# Patient Record
Sex: FEMALE
[De-identification: names, ages, dates, MRNs, and addresses within clinical notes are randomized per-mention and may not be internally consistent; named-entity substitution may affect disease eponyms.]

---

## 2017-07-29 ENCOUNTER — HEALTH MAINTENANCE LETTER (OUTPATIENT)
Age: 47
End: 2017-07-29

## 2017-10-03 ENCOUNTER — HOSPITAL ENCOUNTER (OUTPATIENT)
Dept: PREADMISSION TESTING | Age: 47
Discharge: HOME | End: 2017-10-03
Payer: COMMERCIAL

## 2017-10-03 ENCOUNTER — HOSPITAL ENCOUNTER (OUTPATIENT)
Dept: GENERAL RADIOLOGY | Age: 47
Discharge: HOME | End: 2017-10-03
Payer: COMMERCIAL

## 2017-10-03 VITALS
BODY MASS INDEX: 33.31 KG/M2 | HEIGHT: 62 IN | DIASTOLIC BLOOD PRESSURE: 84 MMHG | SYSTOLIC BLOOD PRESSURE: 137 MMHG | RESPIRATION RATE: 18 BRPM | WEIGHT: 181 LBS | TEMPERATURE: 98.9 F | HEART RATE: 88 BPM

## 2017-10-03 LAB
ABO + RH BLD: NORMAL
ALBUMIN SERPL-MCNC: 3.7 G/DL (ref 3.5–5)
ALBUMIN/GLOB SERPL: 0.9 {RATIO} (ref 1.1–2.2)
ALP SERPL-CCNC: 171 U/L (ref 45–117)
ALT SERPL-CCNC: 62 U/L (ref 12–78)
ANION GAP SERPL CALC-SCNC: 9 MMOL/L (ref 5–15)
AST SERPL-CCNC: 40 U/L (ref 15–37)
ATRIAL RATE: 85 BPM
BASOPHILS # BLD: 0 K/UL (ref 0–0.1)
BASOPHILS NFR BLD: 0 % (ref 0–1)
BILIRUB SERPL-MCNC: 0.4 MG/DL (ref 0.2–1)
BLOOD GROUP ANTIBODIES SERPL: NORMAL
BUN SERPL-MCNC: 13 MG/DL (ref 6–20)
BUN/CREAT SERPL: 17 (ref 12–20)
CALCIUM SERPL-MCNC: 9.5 MG/DL (ref 8.5–10.1)
CALCULATED P AXIS, ECG09: 52 DEGREES
CALCULATED R AXIS, ECG10: 35 DEGREES
CALCULATED T AXIS, ECG11: 37 DEGREES
CHLORIDE SERPL-SCNC: 97 MMOL/L (ref 97–108)
CO2 SERPL-SCNC: 32 MMOL/L (ref 21–32)
CREAT SERPL-MCNC: 0.78 MG/DL (ref 0.55–1.02)
DIAGNOSIS, 93000: NORMAL
EOSINOPHIL # BLD: 0.4 K/UL (ref 0–0.4)
EOSINOPHIL NFR BLD: 3 % (ref 0–7)
ERYTHROCYTE [DISTWIDTH] IN BLOOD BY AUTOMATED COUNT: 14.4 % (ref 11.5–14.5)
GLOBULIN SER CALC-MCNC: 3.9 G/DL (ref 2–4)
GLUCOSE SERPL-MCNC: 286 MG/DL (ref 65–100)
HCT VFR BLD AUTO: 35.3 % (ref 35–47)
HGB BLD-MCNC: 11.4 G/DL (ref 11.5–16)
LYMPHOCYTES # BLD: 2.6 K/UL (ref 0.8–3.5)
LYMPHOCYTES NFR BLD: 23 % (ref 12–49)
MCH RBC QN AUTO: 25.8 PG (ref 26–34)
MCHC RBC AUTO-ENTMCNC: 32.3 G/DL (ref 30–36.5)
MCV RBC AUTO: 79.9 FL (ref 80–99)
MONOCYTES # BLD: 0.8 K/UL (ref 0–1)
MONOCYTES NFR BLD: 7 % (ref 5–13)
NEUTS SEG # BLD: 7.4 K/UL (ref 1.8–8)
NEUTS SEG NFR BLD: 67 % (ref 32–75)
P-R INTERVAL, ECG05: 150 MS
PLATELET # BLD AUTO: 417 K/UL (ref 150–400)
POTASSIUM SERPL-SCNC: 3.6 MMOL/L (ref 3.5–5.1)
PROT SERPL-MCNC: 7.6 G/DL (ref 6.4–8.2)
Q-T INTERVAL, ECG07: 370 MS
QRS DURATION, ECG06: 90 MS
QTC CALCULATION (BEZET), ECG08: 440 MS
RBC # BLD AUTO: 4.42 M/UL (ref 3.8–5.2)
SODIUM SERPL-SCNC: 138 MMOL/L (ref 136–145)
SPECIMEN EXP DATE BLD: NORMAL
VENTRICULAR RATE, ECG03: 85 BPM
WBC # BLD AUTO: 11.1 K/UL (ref 3.6–11)

## 2017-10-03 PROCEDURE — 86900 BLOOD TYPING SEROLOGIC ABO: CPT | Performed by: SPECIALIST

## 2017-10-03 PROCEDURE — 36415 COLL VENOUS BLD VENIPUNCTURE: CPT | Performed by: SPECIALIST

## 2017-10-03 PROCEDURE — 85025 COMPLETE CBC W/AUTO DIFF WBC: CPT | Performed by: SPECIALIST

## 2017-10-03 PROCEDURE — 71020 XR CHEST PA LAT: CPT

## 2017-10-03 PROCEDURE — 93005 ELECTROCARDIOGRAM TRACING: CPT

## 2017-10-03 PROCEDURE — 80053 COMPREHEN METABOLIC PANEL: CPT | Performed by: SPECIALIST

## 2017-10-03 RX ORDER — METFORMIN HYDROCHLORIDE 1000 MG/1
1000 TABLET ORAL 2 TIMES DAILY WITH MEALS
COMMUNITY

## 2017-10-03 RX ORDER — SIMVASTATIN 20 MG/1
20 TABLET, FILM COATED ORAL
COMMUNITY

## 2017-10-03 RX ORDER — ENALAPRIL MALEATE 2.5 MG/1
2.5 TABLET ORAL DAILY
Status: ON HOLD | COMMUNITY
End: 2017-10-10

## 2017-10-03 RX ORDER — CHOLECALCIFEROL TAB 125 MCG (5000 UNIT) 125 MCG
5000 TAB ORAL DAILY
Status: ON HOLD | COMMUNITY
End: 2017-10-10 | Stop reason: SDUPTHER

## 2017-10-03 RX ORDER — GUAIFENESIN 100 MG/5ML
81 LIQUID (ML) ORAL
COMMUNITY

## 2017-10-03 RX ORDER — VALSARTAN AND HYDROCHLOROTHIAZIDE 160; 25 MG/1; MG/1
1 TABLET ORAL
Status: ON HOLD | COMMUNITY
End: 2017-10-10

## 2017-10-04 RX ORDER — INSULIN ASPART 100 [IU]/ML
40 INJECTION, SUSPENSION SUBCUTANEOUS 2 TIMES DAILY
Status: ON HOLD | COMMUNITY
End: 2017-10-10 | Stop reason: SDUPTHER

## 2017-10-09 ENCOUNTER — ANESTHESIA EVENT (OUTPATIENT)
Dept: SURGERY | Age: 47
End: 2017-10-09
Payer: COMMERCIAL

## 2017-10-10 ENCOUNTER — HOSPITAL ENCOUNTER (OUTPATIENT)
Age: 47
Setting detail: OBSERVATION
Discharge: HOME | End: 2017-10-11
Attending: SPECIALIST | Admitting: SPECIALIST
Payer: COMMERCIAL

## 2017-10-10 ENCOUNTER — ANESTHESIA (OUTPATIENT)
Dept: SURGERY | Age: 47
End: 2017-10-10
Payer: COMMERCIAL

## 2017-10-10 PROBLEM — D21.9 FIBROIDS: Status: ACTIVE | Noted: 2017-10-10

## 2017-10-10 LAB
GLUCOSE BLD STRIP.AUTO-MCNC: 193 MG/DL (ref 65–100)
GLUCOSE BLD STRIP.AUTO-MCNC: 327 MG/DL (ref 65–100)
GLUCOSE BLD STRIP.AUTO-MCNC: 88 MG/DL (ref 65–100)
HCG UR QL: NEGATIVE
SERVICE CMNT-IMP: ABNORMAL
SERVICE CMNT-IMP: ABNORMAL
SERVICE CMNT-IMP: NORMAL

## 2017-10-10 PROCEDURE — 76060000035 HC ANESTHESIA 2 TO 2.5 HR: Performed by: SPECIALIST

## 2017-10-10 PROCEDURE — 74011250636 HC RX REV CODE- 250/636: Performed by: ANESTHESIOLOGY

## 2017-10-10 PROCEDURE — 77030013079 HC BLNKT BAIR HGGR 3M -A: Performed by: ANESTHESIOLOGY

## 2017-10-10 PROCEDURE — 76210000017 HC OR PH I REC 1.5 TO 2 HR: Performed by: SPECIALIST

## 2017-10-10 PROCEDURE — 77030031139 HC SUT VCRL2 J&J -A: Performed by: SPECIALIST

## 2017-10-10 PROCEDURE — 74011636637 HC RX REV CODE- 636/637: Performed by: SPECIALIST

## 2017-10-10 PROCEDURE — 74011000250 HC RX REV CODE- 250: Performed by: SPECIALIST

## 2017-10-10 PROCEDURE — 77030008771 HC TU NG SALEM SUMP -A: Performed by: ANESTHESIOLOGY

## 2017-10-10 PROCEDURE — 82962 GLUCOSE BLOOD TEST: CPT

## 2017-10-10 PROCEDURE — 77030010100 HC SEAL ENDOSC AMR -B: Performed by: SPECIALIST

## 2017-10-10 PROCEDURE — C1782 MORCELLATOR: HCPCS | Performed by: SPECIALIST

## 2017-10-10 PROCEDURE — 77030026438 HC STYL ET INTUB CARD -A: Performed by: ANESTHESIOLOGY

## 2017-10-10 PROCEDURE — 77030020263 HC SOL INJ SOD CL0.9% LFCR 1000ML: Performed by: SPECIALIST

## 2017-10-10 PROCEDURE — 65210000002 HC RM PRIVATE GYN

## 2017-10-10 PROCEDURE — 77030031492 HC PRT ACC BLNT AIRSEAL CNMD -B: Performed by: SPECIALIST

## 2017-10-10 PROCEDURE — 77030020268 HC MISC GENERAL SUPPLY: Performed by: SPECIALIST

## 2017-10-10 PROCEDURE — 74011250636 HC RX REV CODE- 250/636

## 2017-10-10 PROCEDURE — 76010000876 HC OR TIME 2 TO 2.5HR INTENSV - TIER 2: Performed by: SPECIALIST

## 2017-10-10 PROCEDURE — 74011250636 HC RX REV CODE- 250/636: Performed by: SPECIALIST

## 2017-10-10 PROCEDURE — 99218 HC RM OBSERVATION: CPT

## 2017-10-10 PROCEDURE — 77030016151 HC PROTCTR LNS DFOG COVD -B: Performed by: SPECIALIST

## 2017-10-10 PROCEDURE — 77030008684 HC TU ET CUF COVD -B: Performed by: ANESTHESIOLOGY

## 2017-10-10 PROCEDURE — 77030035277 HC OBTRTR BLDELSS DISP INTU -B: Performed by: SPECIALIST

## 2017-10-10 PROCEDURE — 77030033067 HC SUT PDO STRATFX SPIR J&J -B: Performed by: SPECIALIST

## 2017-10-10 PROCEDURE — 77030003580 HC NDL INSUF VERES J&J -B: Performed by: SPECIALIST

## 2017-10-10 PROCEDURE — 77030032060 HC PWDR HEMSTAT ARISTA ASRB 3GM BARD -C: Performed by: SPECIALIST

## 2017-10-10 PROCEDURE — 74011000250 HC RX REV CODE- 250

## 2017-10-10 PROCEDURE — 77030010507 HC ADH SKN DERMBND J&J -B: Performed by: SPECIALIST

## 2017-10-10 PROCEDURE — 77030008756 HC TU IRR SUC STRY -B: Performed by: SPECIALIST

## 2017-10-10 PROCEDURE — 81025 URINE PREGNANCY TEST: CPT

## 2017-10-10 PROCEDURE — 77030002933 HC SUT MCRYL J&J -A: Performed by: SPECIALIST

## 2017-10-10 PROCEDURE — 77030011640 HC PAD GRND REM COVD -A: Performed by: SPECIALIST

## 2017-10-10 PROCEDURE — 77030002895 HC DEV VASC CLOSR COVD -B: Performed by: SPECIALIST

## 2017-10-10 PROCEDURE — 77030018836 HC SOL IRR NACL ICUM -A: Performed by: SPECIALIST

## 2017-10-10 PROCEDURE — 77030027743 HC APPL F/HEMSTAT BARD -B: Performed by: SPECIALIST

## 2017-10-10 PROCEDURE — 88307 TISSUE EXAM BY PATHOLOGIST: CPT | Performed by: SPECIALIST

## 2017-10-10 PROCEDURE — 74011250637 HC RX REV CODE- 250/637: Performed by: ANESTHESIOLOGY

## 2017-10-10 PROCEDURE — 77030005518 HC CATH URETH FOL 2W BARD -B: Performed by: SPECIALIST

## 2017-10-10 PROCEDURE — 77030020782 HC GWN BAIR PAWS FLX 3M -B

## 2017-10-10 RX ORDER — MIDAZOLAM HYDROCHLORIDE 1 MG/ML
1 INJECTION, SOLUTION INTRAMUSCULAR; INTRAVENOUS AS NEEDED
Status: DISCONTINUED | OUTPATIENT
Start: 2017-10-10 | End: 2017-10-10 | Stop reason: HOSPADM

## 2017-10-10 RX ORDER — SODIUM CHLORIDE 0.9 % (FLUSH) 0.9 %
5-10 SYRINGE (ML) INJECTION AS NEEDED
Status: DISCONTINUED | OUTPATIENT
Start: 2017-10-10 | End: 2017-10-10 | Stop reason: HOSPADM

## 2017-10-10 RX ORDER — LIDOCAINE HYDROCHLORIDE 20 MG/ML
INJECTION, SOLUTION EPIDURAL; INFILTRATION; INTRACAUDAL; PERINEURAL AS NEEDED
Status: DISCONTINUED | OUTPATIENT
Start: 2017-10-10 | End: 2017-10-10 | Stop reason: HOSPADM

## 2017-10-10 RX ORDER — PROPOFOL 10 MG/ML
INJECTION, EMULSION INTRAVENOUS AS NEEDED
Status: DISCONTINUED | OUTPATIENT
Start: 2017-10-10 | End: 2017-10-10 | Stop reason: HOSPADM

## 2017-10-10 RX ORDER — ONDANSETRON 2 MG/ML
4 INJECTION INTRAMUSCULAR; INTRAVENOUS AS NEEDED
Status: DISCONTINUED | OUTPATIENT
Start: 2017-10-10 | End: 2017-10-10 | Stop reason: HOSPADM

## 2017-10-10 RX ORDER — CHOLECALCIFEROL (VITAMIN D3) 125 MCG
CAPSULE ORAL
Refills: 3 | COMMUNITY
Start: 2017-08-16

## 2017-10-10 RX ORDER — HYDROMORPHONE HYDROCHLORIDE 1 MG/ML
1 INJECTION, SOLUTION INTRAMUSCULAR; INTRAVENOUS; SUBCUTANEOUS
Status: COMPLETED | OUTPATIENT
Start: 2017-10-10 | End: 2017-10-10

## 2017-10-10 RX ORDER — CEFAZOLIN SODIUM IN 0.9 % NACL 2 G/50 ML
2 INTRAVENOUS SOLUTION, PIGGYBACK (ML) INTRAVENOUS
Status: COMPLETED | OUTPATIENT
Start: 2017-10-10 | End: 2017-10-10

## 2017-10-10 RX ORDER — ROCURONIUM BROMIDE 10 MG/ML
INJECTION, SOLUTION INTRAVENOUS AS NEEDED
Status: DISCONTINUED | OUTPATIENT
Start: 2017-10-10 | End: 2017-10-10 | Stop reason: HOSPADM

## 2017-10-10 RX ORDER — ENOXAPARIN SODIUM 100 MG/ML
40 INJECTION SUBCUTANEOUS
Status: COMPLETED | OUTPATIENT
Start: 2017-10-10 | End: 2017-10-10

## 2017-10-10 RX ORDER — GLYCOPYRROLATE 0.2 MG/ML
INJECTION INTRAMUSCULAR; INTRAVENOUS AS NEEDED
Status: DISCONTINUED | OUTPATIENT
Start: 2017-10-10 | End: 2017-10-10 | Stop reason: HOSPADM

## 2017-10-10 RX ORDER — MIDAZOLAM HYDROCHLORIDE 1 MG/ML
INJECTION, SOLUTION INTRAMUSCULAR; INTRAVENOUS AS NEEDED
Status: DISCONTINUED | OUTPATIENT
Start: 2017-10-10 | End: 2017-10-10 | Stop reason: HOSPADM

## 2017-10-10 RX ORDER — DIPHENHYDRAMINE HYDROCHLORIDE 50 MG/ML
12.5 INJECTION, SOLUTION INTRAMUSCULAR; INTRAVENOUS AS NEEDED
Status: DISCONTINUED | OUTPATIENT
Start: 2017-10-10 | End: 2017-10-10 | Stop reason: HOSPADM

## 2017-10-10 RX ORDER — CEFAZOLIN SODIUM IN 0.9 % NACL 2 G/50 ML
2 INTRAVENOUS SOLUTION, PIGGYBACK (ML) INTRAVENOUS EVERY 8 HOURS
Status: COMPLETED | OUTPATIENT
Start: 2017-10-10 | End: 2017-10-11

## 2017-10-10 RX ORDER — FENTANYL CITRATE 50 UG/ML
50 INJECTION, SOLUTION INTRAMUSCULAR; INTRAVENOUS AS NEEDED
Status: DISCONTINUED | OUTPATIENT
Start: 2017-10-10 | End: 2017-10-10 | Stop reason: HOSPADM

## 2017-10-10 RX ORDER — FENTANYL CITRATE 50 UG/ML
25 INJECTION, SOLUTION INTRAMUSCULAR; INTRAVENOUS
Status: COMPLETED | OUTPATIENT
Start: 2017-10-10 | End: 2017-10-10

## 2017-10-10 RX ORDER — FERROUS SULFATE 324(65)MG
TABLET, DELAYED RELEASE (ENTERIC COATED) ORAL
Refills: 3 | COMMUNITY
Start: 2017-07-22

## 2017-10-10 RX ORDER — HYDROMORPHONE HYDROCHLORIDE 1 MG/ML
1 INJECTION, SOLUTION INTRAMUSCULAR; INTRAVENOUS; SUBCUTANEOUS
Status: DISCONTINUED | OUTPATIENT
Start: 2017-10-10 | End: 2017-10-11 | Stop reason: HOSPADM

## 2017-10-10 RX ORDER — SODIUM CHLORIDE, SODIUM LACTATE, POTASSIUM CHLORIDE, CALCIUM CHLORIDE 600; 310; 30; 20 MG/100ML; MG/100ML; MG/100ML; MG/100ML
75 INJECTION, SOLUTION INTRAVENOUS CONTINUOUS
Status: DISCONTINUED | OUTPATIENT
Start: 2017-10-10 | End: 2017-10-10 | Stop reason: HOSPADM

## 2017-10-10 RX ORDER — NEOSTIGMINE METHYLSULFATE 1 MG/ML
INJECTION INTRAVENOUS AS NEEDED
Status: DISCONTINUED | OUTPATIENT
Start: 2017-10-10 | End: 2017-10-10 | Stop reason: HOSPADM

## 2017-10-10 RX ORDER — METRONIDAZOLE 500 MG/100ML
500 INJECTION, SOLUTION INTRAVENOUS
Status: COMPLETED | OUTPATIENT
Start: 2017-10-10 | End: 2017-10-10

## 2017-10-10 RX ORDER — INSULIN ASPART 100 [IU]/ML
INJECTION, SUSPENSION SUBCUTANEOUS
Refills: 1 | COMMUNITY
Start: 2017-08-04

## 2017-10-10 RX ORDER — SODIUM CHLORIDE, SODIUM LACTATE, POTASSIUM CHLORIDE, CALCIUM CHLORIDE 600; 310; 30; 20 MG/100ML; MG/100ML; MG/100ML; MG/100ML
INJECTION, SOLUTION INTRAVENOUS
Status: DISCONTINUED | OUTPATIENT
Start: 2017-10-10 | End: 2017-10-10 | Stop reason: HOSPADM

## 2017-10-10 RX ORDER — MAGNESIUM SULFATE 100 %
4 CRYSTALS MISCELLANEOUS AS NEEDED
Status: DISCONTINUED | OUTPATIENT
Start: 2017-10-10 | End: 2017-10-11 | Stop reason: HOSPADM

## 2017-10-10 RX ORDER — ASPIRIN 81 MG/1
TABLET ORAL
Refills: 2 | Status: ON HOLD | COMMUNITY
Start: 2017-08-09 | End: 2017-10-10 | Stop reason: SDUPTHER

## 2017-10-10 RX ORDER — SODIUM CHLORIDE 9 MG/ML
50 INJECTION, SOLUTION INTRAVENOUS CONTINUOUS
Status: DISCONTINUED | OUTPATIENT
Start: 2017-10-10 | End: 2017-10-10 | Stop reason: HOSPADM

## 2017-10-10 RX ORDER — OXYCODONE HYDROCHLORIDE 5 MG/1
5 TABLET ORAL
Status: DISCONTINUED | OUTPATIENT
Start: 2017-10-10 | End: 2017-10-11 | Stop reason: HOSPADM

## 2017-10-10 RX ORDER — HYDROMORPHONE HYDROCHLORIDE 1 MG/ML
INJECTION, SOLUTION INTRAMUSCULAR; INTRAVENOUS; SUBCUTANEOUS AS NEEDED
Status: DISCONTINUED | OUTPATIENT
Start: 2017-10-10 | End: 2017-10-10 | Stop reason: HOSPADM

## 2017-10-10 RX ORDER — FENTANYL CITRATE 50 UG/ML
INJECTION, SOLUTION INTRAMUSCULAR; INTRAVENOUS AS NEEDED
Status: DISCONTINUED | OUTPATIENT
Start: 2017-10-10 | End: 2017-10-10 | Stop reason: HOSPADM

## 2017-10-10 RX ORDER — ENALAPRIL MALEATE 10 MG/1
TABLET ORAL
Refills: 2 | COMMUNITY
Start: 2017-08-10

## 2017-10-10 RX ORDER — METFORMIN HYDROCHLORIDE 500 MG/1
1000 TABLET ORAL 2 TIMES DAILY WITH MEALS
Status: DISCONTINUED | OUTPATIENT
Start: 2017-10-10 | End: 2017-10-11 | Stop reason: HOSPADM

## 2017-10-10 RX ORDER — ZOLPIDEM TARTRATE 5 MG/1
5 TABLET ORAL
Status: DISCONTINUED | OUTPATIENT
Start: 2017-10-10 | End: 2017-10-11 | Stop reason: HOSPADM

## 2017-10-10 RX ORDER — INSULIN GLARGINE 100 [IU]/ML
61 INJECTION, SOLUTION SUBCUTANEOUS
Status: DISCONTINUED | OUTPATIENT
Start: 2017-10-10 | End: 2017-10-11 | Stop reason: HOSPADM

## 2017-10-10 RX ORDER — SODIUM CHLORIDE 0.9 % (FLUSH) 0.9 %
5-10 SYRINGE (ML) INJECTION AS NEEDED
Status: DISCONTINUED | OUTPATIENT
Start: 2017-10-10 | End: 2017-10-11 | Stop reason: HOSPADM

## 2017-10-10 RX ORDER — KETOROLAC TROMETHAMINE 30 MG/ML
INJECTION, SOLUTION INTRAMUSCULAR; INTRAVENOUS AS NEEDED
Status: DISCONTINUED | OUTPATIENT
Start: 2017-10-10 | End: 2017-10-10 | Stop reason: HOSPADM

## 2017-10-10 RX ORDER — LIDOCAINE HYDROCHLORIDE 10 MG/ML
0.1 INJECTION, SOLUTION EPIDURAL; INFILTRATION; INTRACAUDAL; PERINEURAL AS NEEDED
Status: DISCONTINUED | OUTPATIENT
Start: 2017-10-10 | End: 2017-10-10 | Stop reason: HOSPADM

## 2017-10-10 RX ORDER — INSULIN LISPRO 100 [IU]/ML
INJECTION, SOLUTION INTRAVENOUS; SUBCUTANEOUS EVERY 6 HOURS
Status: DISCONTINUED | OUTPATIENT
Start: 2017-10-10 | End: 2017-10-11 | Stop reason: HOSPADM

## 2017-10-10 RX ORDER — BUPIVACAINE HYDROCHLORIDE AND EPINEPHRINE 2.5; 5 MG/ML; UG/ML
30 INJECTION, SOLUTION EPIDURAL; INFILTRATION; INTRACAUDAL; PERINEURAL ONCE
Status: COMPLETED | OUTPATIENT
Start: 2017-10-10 | End: 2017-10-10

## 2017-10-10 RX ORDER — DIPHENHYDRAMINE HCL 25 MG
25 CAPSULE ORAL
Status: DISCONTINUED | OUTPATIENT
Start: 2017-10-10 | End: 2017-10-11 | Stop reason: HOSPADM

## 2017-10-10 RX ORDER — DEXTROSE 50 % IN WATER (D50W) INTRAVENOUS SYRINGE
12.5-25 AS NEEDED
Status: DISCONTINUED | OUTPATIENT
Start: 2017-10-10 | End: 2017-10-11 | Stop reason: HOSPADM

## 2017-10-10 RX ORDER — ENALAPRIL MALEATE 5 MG/1
10 TABLET ORAL DAILY
Status: DISCONTINUED | OUTPATIENT
Start: 2017-10-11 | End: 2017-10-11 | Stop reason: HOSPADM

## 2017-10-10 RX ORDER — NALOXONE HYDROCHLORIDE 0.4 MG/ML
0.2 INJECTION, SOLUTION INTRAMUSCULAR; INTRAVENOUS; SUBCUTANEOUS AS NEEDED
Status: DISCONTINUED | OUTPATIENT
Start: 2017-10-10 | End: 2017-10-11 | Stop reason: HOSPADM

## 2017-10-10 RX ORDER — SCOLOPAMINE TRANSDERMAL SYSTEM 1 MG/1
1.5 PATCH, EXTENDED RELEASE TRANSDERMAL
Status: COMPLETED | OUTPATIENT
Start: 2017-10-10 | End: 2017-10-10

## 2017-10-10 RX ORDER — MORPHINE SULFATE 10 MG/ML
2 INJECTION, SOLUTION INTRAMUSCULAR; INTRAVENOUS
Status: DISCONTINUED | OUTPATIENT
Start: 2017-10-10 | End: 2017-10-10 | Stop reason: HOSPADM

## 2017-10-10 RX ORDER — ONDANSETRON 2 MG/ML
INJECTION INTRAMUSCULAR; INTRAVENOUS AS NEEDED
Status: DISCONTINUED | OUTPATIENT
Start: 2017-10-10 | End: 2017-10-10 | Stop reason: HOSPADM

## 2017-10-10 RX ORDER — SUCCINYLCHOLINE CHLORIDE 20 MG/ML
INJECTION INTRAMUSCULAR; INTRAVENOUS AS NEEDED
Status: DISCONTINUED | OUTPATIENT
Start: 2017-10-10 | End: 2017-10-10 | Stop reason: HOSPADM

## 2017-10-10 RX ORDER — MIDAZOLAM HYDROCHLORIDE 1 MG/ML
0.5 INJECTION, SOLUTION INTRAMUSCULAR; INTRAVENOUS
Status: DISCONTINUED | OUTPATIENT
Start: 2017-10-10 | End: 2017-10-10 | Stop reason: HOSPADM

## 2017-10-10 RX ORDER — SODIUM CHLORIDE, SODIUM LACTATE, POTASSIUM CHLORIDE, CALCIUM CHLORIDE 600; 310; 30; 20 MG/100ML; MG/100ML; MG/100ML; MG/100ML
150 INJECTION, SOLUTION INTRAVENOUS CONTINUOUS
Status: DISCONTINUED | OUTPATIENT
Start: 2017-10-10 | End: 2017-10-11 | Stop reason: HOSPADM

## 2017-10-10 RX ORDER — SODIUM CHLORIDE 0.9 % (FLUSH) 0.9 %
5-10 SYRINGE (ML) INJECTION EVERY 8 HOURS
Status: DISCONTINUED | OUTPATIENT
Start: 2017-10-10 | End: 2017-10-11 | Stop reason: HOSPADM

## 2017-10-10 RX ORDER — IBUPROFEN 200 MG
600 TABLET ORAL
Status: DISCONTINUED | OUTPATIENT
Start: 2017-10-10 | End: 2017-10-11 | Stop reason: HOSPADM

## 2017-10-10 RX ORDER — HYDROMORPHONE HYDROCHLORIDE 1 MG/ML
0.2 INJECTION, SOLUTION INTRAMUSCULAR; INTRAVENOUS; SUBCUTANEOUS
Status: DISCONTINUED | OUTPATIENT
Start: 2017-10-10 | End: 2017-10-10 | Stop reason: HOSPADM

## 2017-10-10 RX ORDER — DEXAMETHASONE SODIUM PHOSPHATE 4 MG/ML
INJECTION, SOLUTION INTRA-ARTICULAR; INTRALESIONAL; INTRAMUSCULAR; INTRAVENOUS; SOFT TISSUE AS NEEDED
Status: DISCONTINUED | OUTPATIENT
Start: 2017-10-10 | End: 2017-10-10 | Stop reason: HOSPADM

## 2017-10-10 RX ORDER — SODIUM CHLORIDE 0.9 % (FLUSH) 0.9 %
5-10 SYRINGE (ML) INJECTION EVERY 8 HOURS
Status: DISCONTINUED | OUTPATIENT
Start: 2017-10-10 | End: 2017-10-10 | Stop reason: HOSPADM

## 2017-10-10 RX ADMIN — FENTANYL CITRATE 50 MCG: 50 INJECTION, SOLUTION INTRAMUSCULAR; INTRAVENOUS at 10:30

## 2017-10-10 RX ADMIN — ENOXAPARIN SODIUM 40 MG: 40 INJECTION SUBCUTANEOUS at 09:19

## 2017-10-10 RX ADMIN — LIDOCAINE HYDROCHLORIDE 80 MG: 20 INJECTION, SOLUTION EPIDURAL; INFILTRATION; INTRACAUDAL; PERINEURAL at 10:30

## 2017-10-10 RX ADMIN — HYDROMORPHONE HYDROCHLORIDE 1 MG: 1 INJECTION, SOLUTION INTRAMUSCULAR; INTRAVENOUS; SUBCUTANEOUS at 20:48

## 2017-10-10 RX ADMIN — FENTANYL CITRATE 25 MCG: 50 INJECTION, SOLUTION INTRAMUSCULAR; INTRAVENOUS at 14:00

## 2017-10-10 RX ADMIN — HYDROMORPHONE HYDROCHLORIDE 0.2 MG: 1 INJECTION, SOLUTION INTRAMUSCULAR; INTRAVENOUS; SUBCUTANEOUS at 14:41

## 2017-10-10 RX ADMIN — GLYCOPYRROLATE 0.5 MG: 0.2 INJECTION INTRAMUSCULAR; INTRAVENOUS at 12:30

## 2017-10-10 RX ADMIN — SUCCINYLCHOLINE CHLORIDE 160 MG: 20 INJECTION INTRAMUSCULAR; INTRAVENOUS at 10:31

## 2017-10-10 RX ADMIN — HYDROMORPHONE HYDROCHLORIDE 0.2 MG: 1 INJECTION, SOLUTION INTRAMUSCULAR; INTRAVENOUS; SUBCUTANEOUS at 14:10

## 2017-10-10 RX ADMIN — FENTANYL CITRATE 75 MCG: 50 INJECTION, SOLUTION INTRAMUSCULAR; INTRAVENOUS at 11:09

## 2017-10-10 RX ADMIN — NEOSTIGMINE METHYLSULFATE 3.5 MG: 1 INJECTION INTRAVENOUS at 12:30

## 2017-10-10 RX ADMIN — ROCURONIUM BROMIDE 5 MG: 10 INJECTION, SOLUTION INTRAVENOUS at 10:30

## 2017-10-10 RX ADMIN — MIDAZOLAM HYDROCHLORIDE 2 MG: 1 INJECTION, SOLUTION INTRAMUSCULAR; INTRAVENOUS at 10:22

## 2017-10-10 RX ADMIN — ROCURONIUM BROMIDE 25 MG: 10 INJECTION, SOLUTION INTRAVENOUS at 10:40

## 2017-10-10 RX ADMIN — ONDANSETRON 4 MG: 2 INJECTION INTRAMUSCULAR; INTRAVENOUS at 10:39

## 2017-10-10 RX ADMIN — HYDROMORPHONE HYDROCHLORIDE 0.5 MG: 1 INJECTION, SOLUTION INTRAMUSCULAR; INTRAVENOUS; SUBCUTANEOUS at 12:26

## 2017-10-10 RX ADMIN — INSULIN GLARGINE 61 UNITS: 100 INJECTION, SOLUTION SUBCUTANEOUS at 22:51

## 2017-10-10 RX ADMIN — CEFAZOLIN 2 G: 1 INJECTION, POWDER, FOR SOLUTION INTRAMUSCULAR; INTRAVENOUS; PARENTERAL at 10:37

## 2017-10-10 RX ADMIN — HYDROMORPHONE HYDROCHLORIDE 1 MG: 1 INJECTION, SOLUTION INTRAMUSCULAR; INTRAVENOUS; SUBCUTANEOUS at 16:18

## 2017-10-10 RX ADMIN — DEXAMETHASONE SODIUM PHOSPHATE 6 MG: 4 INJECTION, SOLUTION INTRA-ARTICULAR; INTRALESIONAL; INTRAMUSCULAR; INTRAVENOUS; SOFT TISSUE at 10:39

## 2017-10-10 RX ADMIN — SODIUM CHLORIDE, SODIUM LACTATE, POTASSIUM CHLORIDE, AND CALCIUM CHLORIDE 150 ML/HR: 600; 310; 30; 20 INJECTION, SOLUTION INTRAVENOUS at 22:50

## 2017-10-10 RX ADMIN — CEFAZOLIN 2 G: 1 INJECTION, POWDER, FOR SOLUTION INTRAMUSCULAR; INTRAVENOUS; PARENTERAL at 20:53

## 2017-10-10 RX ADMIN — FENTANYL CITRATE 25 MCG: 50 INJECTION, SOLUTION INTRAMUSCULAR; INTRAVENOUS at 13:42

## 2017-10-10 RX ADMIN — SODIUM CHLORIDE, SODIUM LACTATE, POTASSIUM CHLORIDE, AND CALCIUM CHLORIDE 150 ML/HR: 600; 310; 30; 20 INJECTION, SOLUTION INTRAVENOUS at 15:20

## 2017-10-10 RX ADMIN — FENTANYL CITRATE 50 MCG: 50 INJECTION, SOLUTION INTRAMUSCULAR; INTRAVENOUS at 12:31

## 2017-10-10 RX ADMIN — SODIUM CHLORIDE, SODIUM LACTATE, POTASSIUM CHLORIDE, CALCIUM CHLORIDE: 600; 310; 30; 20 INJECTION, SOLUTION INTRAVENOUS at 10:20

## 2017-10-10 RX ADMIN — HYDROMORPHONE HYDROCHLORIDE 0.25 MG: 1 INJECTION, SOLUTION INTRAMUSCULAR; INTRAVENOUS; SUBCUTANEOUS at 12:32

## 2017-10-10 RX ADMIN — INSULIN LISPRO 2 UNITS: 100 INJECTION, SOLUTION INTRAVENOUS; SUBCUTANEOUS at 14:31

## 2017-10-10 RX ADMIN — ROCURONIUM BROMIDE 5 MG: 10 INJECTION, SOLUTION INTRAVENOUS at 11:23

## 2017-10-10 RX ADMIN — METRONIDAZOLE 500 MG: 500 INJECTION, SOLUTION INTRAVENOUS at 10:57

## 2017-10-10 RX ADMIN — FENTANYL CITRATE 25 MCG: 50 INJECTION, SOLUTION INTRAMUSCULAR; INTRAVENOUS at 13:12

## 2017-10-10 RX ADMIN — FENTANYL CITRATE 75 MCG: 50 INJECTION, SOLUTION INTRAMUSCULAR; INTRAVENOUS at 10:43

## 2017-10-10 RX ADMIN — HYDROMORPHONE HYDROCHLORIDE 0.2 MG: 1 INJECTION, SOLUTION INTRAMUSCULAR; INTRAVENOUS; SUBCUTANEOUS at 14:26

## 2017-10-10 RX ADMIN — Medication 10 ML: at 16:20

## 2017-10-10 RX ADMIN — KETOROLAC TROMETHAMINE 30 MG: 30 INJECTION, SOLUTION INTRAMUSCULAR; INTRAVENOUS at 12:32

## 2017-10-10 RX ADMIN — ROCURONIUM BROMIDE 10 MG: 10 INJECTION, SOLUTION INTRAVENOUS at 11:31

## 2017-10-10 RX ADMIN — PROPOFOL 200 MG: 10 INJECTION, EMULSION INTRAVENOUS at 10:30

## 2017-10-10 RX ADMIN — SODIUM CHLORIDE, SODIUM LACTATE, POTASSIUM CHLORIDE, CALCIUM CHLORIDE: 600; 310; 30; 20 INJECTION, SOLUTION INTRAVENOUS at 11:37

## 2017-10-10 RX ADMIN — FENTANYL CITRATE 25 MCG: 50 INJECTION, SOLUTION INTRAMUSCULAR; INTRAVENOUS at 13:50

## 2017-10-10 RX ADMIN — SCOPALAMINE 1.5 MG: 1 PATCH, EXTENDED RELEASE TRANSDERMAL at 10:22

## 2017-10-10 RX ADMIN — PROMETHAZINE HYDROCHLORIDE 12.5 MG: 25 INJECTION INTRAMUSCULAR; INTRAVENOUS at 16:23

## 2017-10-10 RX ADMIN — INSULIN LISPRO 7 UNITS: 100 INJECTION, SOLUTION INTRAVENOUS; SUBCUTANEOUS at 21:17

## 2017-10-10 RX ADMIN — HYDROMORPHONE HYDROCHLORIDE 0.25 MG: 1 INJECTION, SOLUTION INTRAMUSCULAR; INTRAVENOUS; SUBCUTANEOUS at 11:23

## 2017-10-10 NOTE — IP AVS SNAPSHOT
[address] 
[phone] Patient: Tonia Mcmahon MRN: [id number] ESS:1/20/9043 Current Discharge Medication List  
  
START taking these medications Dose & Instructions Dispensing Information Comments Morning Noon Evening Bedtime  
 ibuprofen 600 mg tablet Commonly known as:  MOTRIN Your last dose was: Your next dose is:    
   
   
 Dose:  600 mg Take 1 Tab by mouth every six (6) hours as needed. Indications: Pain Quantity:  30 Tab Refills:  0  
     
   
   
   
  
 oxyCODONE IR 5 mg immediate release tablet Commonly known as:  Kendal Mike Your last dose was: Your next dose is:    
   
   
 Dose:  5 mg Take 1 Tab by mouth every six (6) hours as needed. Max Daily Amount: 20 mg.  
 Quantity:  30 Tab Refills:  0 ASK your doctor about these medications Dose & Instructions Dispensing Information Comments Morning Noon Evening Bedtime  
 aspirin 81 mg chewable tablet What changed:  Another medication with the same name was removed. Continue taking this medication, and follow the directions you see here. Your last dose was: Your next dose is:    
   
   
 Dose:  81 mg Take 81 mg by mouth every morning. Refills:  0  
     
   
   
   
  
 cholecalciferol 5,000 unit capsule Commonly known as:  VITAMIN D3 What changed:  Another medication with the same name was removed. Continue taking this medication, and follow the directions you see here. Your last dose was: Your next dose is: TAKE 1 CAPSULE BY MOUTH ONCE DAILY Refills:  3  
     
   
   
   
  
 enalapril 10 mg tablet Commonly known as:  Javan Nissen Your last dose was: Your next dose is: TAKE 1 TABLET BY MOUTH EVERY DAY ONCE A DAY ORALLY 90 DAYS Refills:  2 ferrous sulfate 324 mg (65 mg iron) tablet Your last dose was: Your next dose is: TAKE 1 TABLET ONCE A DAY ORALLY 30 DAY(S) Refills:  3  
     
   
   
   
  
 metFORMIN 1,000 mg tablet Commonly known as:  GLUCOPHAGE Your last dose was: Your next dose is:    
   
   
 Dose:  1000 mg Take 1,000 mg by mouth two (2) times daily (with meals). Refills:  0 NovoLOG Mix 70-30 FlexPen 100 unit/mL (70-30) Inpn Generic drug:  insulin aspart protamine/insulin aspart What changed:  Another medication with the same name was removed. Continue taking this medication, and follow the directions you see here. Your last dose was: Your next dose is:    
   
   
 INYECTA 35 UNIDADES CON DESAYUNO,40 UNIDADES CON ALMUERZO,Y 35 UNIDADES CON LA TANI 3 VECES CADA GABRIELA Refills:  1  
     
   
   
   
  
 simvastatin 20 mg tablet Commonly known as:  ZOCOR Your last dose was: Your next dose is:    
   
   
 Dose:  20 mg Take 20 mg by mouth nightly. Refills:  0  
     
   
   
   
  
 TOUJEO SOLOSTAR 300 unit/mL (1.5 mL) Inpn Generic drug:  insulin glargine Your last dose was: Your next dose is:    
   
   
 Dose:  72 Units 72 Units by SubCUTAneous route nightly. Refills:  0 Where to Get Your Medications Information on where to get these meds will be given to you by the nurse or doctor. ! Ask your nurse or doctor about these medications  
  ibuprofen 600 mg tablet  
 oxyCODONE IR 5 mg immediate release tablet

## 2017-10-10 NOTE — ROUTINE PROCESS
Patient: Ruben Cheung MRN: [id number]  SSN: [id number]   YOB: 1970  Age: 56 y.o. Sex: female     Patient is status post Procedure(s):  DAVINCI SUPRACERVICAL LAPAROSCOPIC HYSTERECTOMY, BILATERAL SALPINGECTOMY; RIGHT OOPHERECTOMY. Surgeon(s) and Role:     * Josiane Rosario MD - Primary     * Viola Woo MD - Co-Surgeon     * Sadi Granda MD - Assisting    Local/Dose/Irrigation:  Marcaine 0.25% with epi 30 ml                  Peripheral IV 10/10/17 Left Hand (Active)   Site Assessment Clean, dry, & intact 10/10/2017  9:21 AM   Phlebitis Assessment 0 10/10/2017  9:21 AM   Infiltration Assessment 0 10/10/2017  9:21 AM   Dressing Status Clean, dry, & intact; New 10/10/2017  9:21 AM   Dressing Type Tape;Transparent 10/10/2017  9:21 AM   Hub Color/Line Status Green; Infusing 10/10/2017  9:21 AM   Action Taken Open ports on tubing capped 10/10/2017  9:21 AM   Alcohol Cap Used Yes 10/10/2017  9:21 AM            Airway - Endotracheal Tube 10/10/17 Oral (Active)                   Dressing/Packing:  Wound Abdomen Anterior-DRESSING TYPE: Topical skin adhesive/glue; Adhesive wound dressing (Band-Aid) (10/10/17 1232)  Splint/Cast:  ]    Other:  Ramsay; scds;peripad          Patient: Ruben Cheung MRN: [id number]  SSN: [id number]   YOB: 1970  Age: 56 y.o. Sex: female     Patient is status post Procedure(s):  DAVINCI SUPRACERVICAL LAPAROSCOPIC HYSTERECTOMY, BILATERAL SALPINGECTOMY; RIGHT OOPHERECTOMY. Surgeon(s) and Role:     * Josiane Rosario MD - Primary     * Viola Woo MD - Co-Surgeon     * Sadi Granda MD - Assisting    Local/Dose/Irrigation:                    Peripheral IV 10/10/17 Left Hand (Active)   Site Assessment Clean, dry, & intact 10/10/2017  9:21 AM   Phlebitis Assessment 0 10/10/2017  9:21 AM   Infiltration Assessment 0 10/10/2017  9:21 AM   Dressing Status Clean, dry, & intact; New 10/10/2017  9:21 AM   Dressing Type Tape;Transparent 10/10/2017  9:21 AM Hub Color/Line Status Green; Infusing 10/10/2017  9:21 AM   Action Taken Open ports on tubing capped 10/10/2017  9:21 AM   Alcohol Cap Used Yes 10/10/2017  9:21 AM            Airway - Endotracheal Tube 10/10/17 Oral (Active)                   Dressing/Packing:  Wound Abdomen Anterior-DRESSING TYPE: Topical skin adhesive/glue; Adhesive wound dressing (Band-Aid) (10/10/17 1232)  Splint/Cast:  ]    Other:

## 2017-10-10 NOTE — PROGRESS NOTES
Problem: Abdominal Hysterectomy: Day of Surgery  Goal: *Optimal pain control at patients stated goal  Outcome: Progressing Towards Goal  Using IV pain medication

## 2017-10-10 NOTE — ANESTHESIA PREPROCEDURE EVALUATION
Anesthetic History     PONV          Review of Systems / Medical History  Patient summary reviewed, nursing notes reviewed and pertinent labs reviewed    Pulmonary  Within defined limits                 Neuro/Psych   Within defined limits           Cardiovascular    Hypertension              Exercise tolerance: >4 METS     GI/Hepatic/Renal  Within defined limits              Endo/Other    Diabetes    Obesity     Other Findings              Physical Exam    Airway  Mallampati: III  TM Distance: 4 - 6 cm  Neck ROM: normal range of motion   Mouth opening: Normal     Cardiovascular  Regular rate and rhythm,  S1 and S2 normal,  no murmur, click, rub, or gallop  Rhythm: regular  Rate: normal         Dental  No notable dental hx       Pulmonary  Breath sounds clear to auscultation               Abdominal  GI exam deferred       Other Findings            Anesthetic Plan    ASA: 3  Anesthesia type: general          Induction: Intravenous  Anesthetic plan and risks discussed with: Patient

## 2017-10-10 NOTE — PERIOP NOTES
TRANSFER - OUT REPORT:    Verbal report given to Joselin Cisneros on Osiel Owusu  being transferred to room 360 for routine post - op       Report consisted of patients Situation, Background, Assessment and   Recommendations(SBAR). Time Pre op antibiotic given:2 gram ancef/ flagyl  Anesthesia Stop time: 6216  Ramsay Present on Transfer to floor:yes  Order for Ramsay on Chart:yes    Information from the following report(s) SBAR, OR Summary, Intake/Output and MAR was reviewed with the receiving nurse. Opportunity for questions and clarification was provided. Is the patient on 02? YES       L/Min 2       Other     Is the patient on a monitor? NO    Is the nurse transporting with the patient? NO    Surgical Waiting Area notified of patient's transfer from PACU? YES      The following personal items collected during your admission accompanied patient upon transfer:   Dental Appliance:    Vision:    Hearing Aid:    Jewelry: Jewelry: None  Clothing: Clothing:  (clothing to [employer])  Other Valuables:  Other Valuables: None  Valuables sent to safe:

## 2017-10-10 NOTE — PROGRESS NOTES
TRANSFER - IN REPORT:    Verbal report received from [address] (name) on Darrell Blackburn  being received from PACU (unit) for routine post - op      Report consisted of patients Situation, Background, Assessment and   Recommendations(SBAR). Information from the following report(s) SBAR, Kardex, OR Summary, Procedure Summary, Intake/Output, MAR, Accordion, Recent Results and Cardiac Rhythm Sinus Rhythm was reviewed with the receiving nurse. Opportunity for questions and clarification was provided. Assessment completed upon patients arrival to unit and care assumed.

## 2017-10-10 NOTE — IP AVS SNAPSHOT
[address] 
[phone] Patient: Burt Swartz MRN: [id number] NXJ:8/30/[birth year] You are allergic to the following Allergen Reactions Tylenol (Acetaminophen) Itching Recent Documentation Height Weight BMI OB Status Smoking Status 1.575 m 82.1 kg 33.11 kg/m2 Having regular periods Never Smoker Emergency Contacts Name Discharge Info Relation Home Work Mobile Rudy Love DISCHARGE CAREGIVER [3] Spouse [3] [phone] About your hospitalization You were admitted on:  October 10, 2017 You last received care in the:  [address] You were discharged on:  October 11, 2017 Unit phone number:  [phone] Why you were hospitalized Your primary diagnosis was:  Not on File Your diagnoses also included:  Fibroids Providers Seen During Your Hospitalizations Provider Role Specialty Primary office phone Elodia Torres MD Attending Provider Obstetrics & Gynecology [phone] Your Primary Care Physician (PCP) Primary Care Physician Office Phone Office Fax Sherrine Snellen [phone] Follow-up Information Follow up With Details Comments Contact Info Liberty Morales MD   [address] [place] [address] 
[phone] Current Discharge Medication List  
  
START taking these medications Dose & Instructions Dispensing Information Comments Morning Noon Evening Bedtime  
 ibuprofen 600 mg tablet Commonly known as:  MOTRIN Your last dose was: Your next dose is:    
   
   
 Dose:  600 mg Take 1 Tab by mouth every six (6) hours as needed. Indications: Pain Quantity:  30 Tab Refills:  0  
     
   
   
   
  
 oxyCODONE IR 5 mg immediate release tablet Commonly known as:  Kash Murphy  
   
 Your last dose was: Your next dose is:    
   
   
 Dose:  5 mg Take 1 Tab by mouth every six (6) hours as needed. Max Daily Amount: 20 mg.  
 Quantity:  30 Tab Refills:  0 ASK your doctor about these medications Dose & Instructions Dispensing Information Comments Morning Noon Evening Bedtime  
 aspirin 81 mg chewable tablet What changed:  Another medication with the same name was removed. Continue taking this medication, and follow the directions you see here. Your last dose was: Your next dose is:    
   
   
 Dose:  81 mg Take 81 mg by mouth every morning. Refills:  0  
     
   
   
   
  
 cholecalciferol 5,000 unit capsule Commonly known as:  VITAMIN D3 What changed:  Another medication with the same name was removed. Continue taking this medication, and follow the directions you see here. Your last dose was: Your next dose is: TAKE 1 CAPSULE BY MOUTH ONCE DAILY Refills:  3  
     
   
   
   
  
 enalapril 10 mg tablet Commonly known as:  Harrold Harps Your last dose was: Your next dose is: TAKE 1 TABLET BY MOUTH EVERY DAY ONCE A DAY ORALLY 90 DAYS Refills:  2  
     
   
   
   
  
 ferrous sulfate 324 mg (65 mg iron) tablet Your last dose was: Your next dose is: TAKE 1 TABLET ONCE A DAY ORALLY 30 DAY(S) Refills:  3  
     
   
   
   
  
 metFORMIN 1,000 mg tablet Commonly known as:  GLUCOPHAGE Your last dose was: Your next dose is:    
   
   
 Dose:  1000 mg Take 1,000 mg by mouth two (2) times daily (with meals). Refills:  0 NovoLOG Mix 70-30 FlexPen 100 unit/mL (70-30) Inpn Generic drug:  insulin aspart protamine/insulin aspart What changed:  Another medication with the same name was removed. Continue taking this medication, and follow the directions you see here. Your last dose was: Your next dose is:    
   
   
 INYECTA 35 UNIDADES CON DESAYUNO,40 UNIDADES CON ALMUERZO,Y 35 UNIDADES CON LA TANI 3 VECES CADA GABRIELA Refills:  1  
     
   
   
   
  
 simvastatin 20 mg tablet Commonly known as:  ZOCOR Your last dose was: Your next dose is:    
   
   
 Dose:  20 mg Take 20 mg by mouth nightly. Refills:  0  
     
   
   
   
  
 TOUJEO SOLOSTAR 300 unit/mL (1.5 mL) Inpn Generic drug:  insulin glargine Your last dose was: Your next dose is:    
   
   
 Dose:  72 Units 72 Units by SubCUTAneous route nightly. Refills:  0 Where to Get Your Medications Information on where to get these meds will be given to you by the nurse or doctor. ! Ask your nurse or doctor about these medications  
  ibuprofen 600 mg tablet  
 oxyCODONE IR 5 mg immediate release tablet Discharge Instructions Histerectomía laparoscópica: Jenness Ormond en el hogar - [ Laparoscopic Hysterectomy: What to Expect at [facility] ] Jimenez recuperación Nitzacash Villa es kailey cirugía para extraer el Destiny Marcelo. En algunos casos, también se extraen los ovarios y las trompas de [place] al [employer]. Usted puede esperar sentirse mejor y más saad cada día, aunque es posible que necesite analgésicos (medicamentos para el dolor) por kailey o [place]. Es posible que se canse con facilidad o que tenga menos energía de lo habitual. El cansancio podría durar varias semanas después de la cirugía. Probablemente notará que jimenez abdomen está hinchado. [place] es común. La hinchazón tardará varias semanas en desaparecer. Nicola vez necesite entre 4 y 6 semanas hasta que se recupere por completo. Es importante que evite levantar objetos jono la recuperación para que pueda sanar. Esta hoja de [employer] idea general de cuánto tiempo tardará en recuperarse. Trista cada persona se recupera a un ritmo diferente.  Siga los pasos a continuación para mejorar tan rápido muriel sea posible. Cómo puede cuidarse en el hogar? Actividad · Descanse cuando se sienta cansada. · Lerry Fruit. Caminar es kailey buena opción. · Deje que sane la arlen. No se mueva con rapidez ni levante nada pesado hasta que se encuentre mejor. · Puede ducharse entre 24 y 50 horas después de la [facility], si edwards médico lo [place]. Seque la incisión con toques suaves de toalla. No tome un baño de inmersión jono las primeras 2 semanas, o hasta que edwards médico lo apruebe. · Pregúntele a edwards médico cuándo puede tener [employer]. Alimentación · Puede seguir edwards dieta normal. Si tiene [employer], pruebe alimentos suaves y bajos en grasa, muriel arroz sin condimentar, khris asado, pan maura y yogur. · Si no evacua los intestinos con regularidad magi después de la cirugía, trate de evitar el estreñimiento y hacer esfuerzos. Jeannette abundante agua. Edwards médico podría sugerirle que tome fibra, un ablandador de heces o un laxante [place]. Medicamentos · Edwards médico le dirá si puede volver a ashley juju medicamentos y cuándo puede volver a hacerlo. También le dará indicaciones sobre cualquier medicamento nuevo que deba ashley usted. · Si niels medicamentos que previenen la formación de coágulos de Hemal, muriel warfarina (Coumadin), clopidogrel (Plavix) o aspirina, asegúrese de hablar con edwards médico. Él o luba le dirá si debe volver a ashley estos medicamentos y en qué momento. Asegúrese de que entiende exactamente lo que el médico quiere que mary. · Sea alexandra con los medicamentos. Indira y siga todas las instrucciones de la [place]. ¨ Si el médico le recetó analgésicos (medicamentos para el dolor), tómelos según las indicaciones. ¨ Si no está tomando un analgésico recetado, pregúntele a edwards médico si puede ashley kalen de [employer]. · Si edwards médico le recetó antibióticos, tómelos según las indicaciones.  No deje de tomarlos solo porque se sienta mejor. Debe ashley todos los antibióticos hasta terminarlos. Cuidado de la incisión · Es posible que tenga puntos de sutura sobre los mario (incisiones) que el médico le hizo en el abdomen. · Si tiene tiras de cinta adhesiva sobre el yasmin (incisión) que hizo el médico, déjeselas puestas kailey semana o hasta que se caigan por sí solas. · Lave la arlen a diario con agua tibia micronesia y séquela con toques suaves de toalla. No use agua oxigenada ni alcohol. Pueden retrasar la sanación. · Puede cubrir la arlen con kailey venda de gasa si le sale líquido o esta roza contra la ropa. · Cambie la venda todos los días. Otras instrucciones · Podría tener un leve sangrado vaginal. Use toallas sanitarias si es necesario. No se mary lavados vaginales ni utilice tampones. · No tenga relaciones sexuales hasta que edwards médico lo apruebe. La atención de seguimiento es kailey parte clave de edwards tratamiento y seguridad. Asegúrese de hacer y acudir a todas las citas, y llame a edwards médico si está teniendo problemas. También es kailey buena idea saber los resultados de juju exámenes y mantener kailey lista de los medicamentos que niels. Cuándo debe pedir ayuda? Llame al 911 en cualquier momento que considere que puede necesitar atención de [place]. Por ejemplo, llame si: 
· Se desmayó (perdió el conocimiento). · Tiene dolor repentino en el pecho y falta de [place], o tose ankit. Llame a edwards médico ahora mismo o busque atención médica inmediata si: · Tiene sangrado vaginal intenso. Emerson significa que usted empapa juju toallas sanitarias habituales cada hora jono 2 horas o más. · Tiene dolor repentino e intenso en el abdomen o en la pelvis. · Tiene puntos de sutura flojos o se abre la incisión. · [id number], tales muriel: ¨ Aumento del dolor, la hinchazón, la temperatura o el enrojecimiento. ¨ Vetas kerr que salen de la incisión. ¨ Pus que sale de la incisión. ¨ Ganglios linfáticos inflamados en el isael, las axilas o la jose. Sergio Pee. Preste especial atención a los cambios en edwards david y asegúrese de comunicarse con edwards médico si: 
· No mejora muriel se esperaba. Dónde puede encontrar más información en inglés? Jac Ramos a [email or web address]-araceli.[email or web address] Raymundobony Kohler Q131 en la búsqueda para aprender más acerca de \"Histerectomía laparoscópica: Aimee Garcia en el hogar - [ Laparoscopic Hysterectomy: What to Expect at [facility] ]. \" 
Revisado: 23 noviembre, 2016 Versión del contenido: 11.3 © 0103-8014 Healthwise, Incorporated. Las instrucciones de cuidado fueron adaptadas bajo licencia por [employer] (which disclaims liability or warranty for this information). Si usted tiene [place] afección médica o sobre estas instrucciones, siempre pregunte a edwards profesional de david. Healthwise, Incorporated niega toda garantía o responsabilidad por edwards uso de esta información. Discharge Orders None Introducing [facility]! Nan Mai introduces cocone patient portal. Now you can access parts of your medical record, email your doctor's office, and request medication refills online. 1. In your internet browser, go to [email or web address] 2. Click on the First Time User? Click Here link in the Sign In box. You will see the New Member Sign Up page. 3. Enter your cocone Access Code exactly as it appears below. You will not need to use this code after youve completed the sign-up process. If you do not sign up before the expiration date, you must request a new code. · cocone Access Code: [email or web address] Expires: 1/1/2018  1:31 PM 
 
4. Enter the last four digits of your Social Security Number (xxxx) and Date of Birth (mm/dd/yyyy) as indicated and click Submit. You will be taken to the next sign-up page. 5. Create a MyChart ID.  This will be your cocone login ID and cannot be changed, so think of one that is secure and easy to remember. 6. Create a Zeuss password. You can change your password at any time. 7. Enter your Password Reset Question and Answer. This can be used at a later time if you forget your password. 8. Enter your e-mail address. You will receive e-mail notification when new information is available in [address] 9. Click Sign Up. You can now view and download portions of your medical record. 10. Click the Download Summary menu link to download a portable copy of your medical information. If you have questions, please visit the Frequently Asked Questions section of the Zeuss website. Remember, Zeuss is NOT to be used for urgent needs. For medical emergencies, dial 911. Now available from your iPhone and Android! General Information Please provide this summary of care documentation to your next provider. Patient Signature:  ____________________________________________________________ Date:  ____________________________________________________________  
  
[facility] Provider Signature:  ____________________________________________________________ Date:  ____________________________________________________________

## 2017-10-10 NOTE — OP NOTES
[address] [facility] [address], [address]   OP NOTE       Name:  Soledad Favre   MR#:  [id number]   :  1970   Account #:  [de-identified]    Surgery Date:  10/10/2017   Date of Adm:  10/10/2017       SURGEON: Dr. Inocencia Hill     ASSISTANT: Zofia Salas MD and Kami Morgan MD    PROCEDURES PERFORMED: Jose Martin Ready laparoscopic supracervical   hysterectomy with right salpingo-oophorectomy and left salpingectomy. PREOPERATIVE DIAGNOSIS: Symptomatic fibroid uterus. POSTOPERATIVE DIAGNOSIS: Symptomatic fibroid uterus. ANESTHESIA: General endotracheal.    FINDINGS: With laparoscopy, there was an enlarged fibroid uterus. There were multiple adhesions around the uterus, especially to the   anterior abdominal wall and posteriorly to the bowel. The posterior cul-  de-sac was obliterated with adhesions. The right tube and ovary were   strongly adherent to the uterine surface. The left tube and ovary   appeared normal.    ESTIMATED BLOOD LOSS: 100 mL. DESCRIPTION OF PROCEDURE: The patient was taken to the   operating room, placed on the operating room table. After adequate   anesthesia was obtained, she was placed in the dorsal lithotomy   position. The abdomen, perineum and vagina were prepped and the   patient was draped in the usual sterile fashion. A sterile speculum was   placed. A single-tooth tenaculum was placed on the anterior lip of the   cervix. A uterine manipulator was placed and all other instruments   were removed. The umbilical skin and bilateral port sites were injected with Marcaine. The umbilical skin was grasped with 2 towel clips. A Veress needle   was inserted. The abdomen was insufflated to approximately 2 liters of   CO2 gas. The Veress needle was removed and a small incision was   made. A 12 mm bladeless trocar was inserted. Laparoscopy verified   successful entry.  Eight mm da Armin ports were placed bilaterally, as   well as an 8 mm port in the right lower quadrant. These were all done   under direct visualization. The patient was placed in Trendelenburg   position. The Amara7 robot was docked and instruments were placed. Attention was turned to the adhesion between the uterus, bladder and   the anterior abdominal wall. These were taken down using sharp and   blunt dissection. Attention was turned to the adhesions in the posterior   cul-de-sac to the rectosigmoid. These were carefully taken down using   sharp and blunt dissection and hydrodissection. Attention was then turned to the right infundibulopelvic ligament which   was cauterized with bipolar cautery and cut using the hot dani. The   right side of the bladder flap was created. Attention was turned to the left utero-ovarian ligament, round ligament,   and fallopian tube, which were cauterized using bipolar cautery and cut   using hot dani and the left side of the bladder flap was created. There was more dissection to free up the uterus, both anteriorly and   posteriorly. There were severe adhesions from the uterus to the right   pelvic sidewall as well. These were carefully taken down using sharp   and blunt dissection. The uterine vessels were then bilaterally   cauterized and cut. The uterus was amputated from the cervix using   the hot dani and bipolar cautery. The endocervical canal was   cauterized. The pelvis was irrigated and suctioned. The right lower   quadrant port was replaced with the morcellator. The uterus was   carefully dissected off the bowel to completely free it up. The uterus   was removed using the morcellator. All pieces were removed. The right   tube and ovary were removed with the uterus. When this was complete, attention was turned to the left mesosalpinx,   which was cauterized and cut using the hot dani and the left tube   was removed. The pelvis was irrigated and suctioned. Small bleeders   were controlled using bipolar cautery.  Hemostasis was assured. Tisseel and Erick were placed over all operative areas and   hemostasis was once again assured. The robot was removed from the   patient. The right lower quadrant port was closed at the fascia using an   EndoClose with 0 Vicryl. The abdomen was desufflated and all trocars   were removed. Skin incisions were closed using interrupted sutures of   4-0 Monocryl and Dermabond. There was clear urine draining from the   Ramsay at the end of the procedure. All sponge, needle and instrument   counts were correct x2 at the end of the procedure. The patient   tolerated the procedure well and was taken to the post-anesthesia care   unit in satisfactory condition. SPECIMENS REMOVED: Morcellated uterus, right tube and ovary, left   fallopian tube.         Shazia Cage MD      [facility] Trisha Fowler   D:  10/10/2017   12:37   T:  10/10/2017   13:35   Job #:  [id number]

## 2017-10-10 NOTE — H&P
Gynecology History and Physical    Name: Vicki Paez MRN: [id number] SSN: [id number]    YOB: 1970  Age: 56 y.o. Sex: female       Subjective:      Chief complaint:  Shari Carranza I is a 56 y.o.  female with a history of fibroids. Previous workup included Ultrasound which revealed fibroid(s). Previous treatment measures included nonsteroidal anti-inflammatory drugs (NSAIDs). She is admitted for Procedure(s) (LRB):  DAVINCI TOTAL LAPAROSCOPIC HYSTERECTOMY, BILATERAL SALPINGECTOMY (N/A). The current method of family planning is none. OB History     No data available        Past Medical History:   Diagnosis Date    Diabetes ([address]     Hypertension     Nausea & vomiting      Past Surgical History:   Procedure Laterality Date    HX GYN  2011    REMOVAL OF FIBROIDS    HX ORTHOPAEDIC      CARPEL TUNNEL R HAND    HX ORTHOPAEDIC      L BEHIND THE  KNEE SURGERY      Social History     Occupational History    Not on file. Social History Main Topics    Smoking status: Never Smoker    Smokeless tobacco: Never Used    Alcohol use No    Drug use: No    Sexual activity: Not on file     Family History   Problem Relation Age of Onset    Diabetes Mother     No Known Problems Sister     No Known Problems Brother     No Known Problems Sister     No Known Problems Sister     No Known Problems Sister     No Known Problems Brother     No Known Problems Brother     No Known Problems Brother     No Known Problems Brother     Diabetes Brother         Allergies   Allergen Reactions    Tylenol [Acetaminophen] Itching     Prior to Admission medications    Medication Sig Start Date End Date Taking?  Authorizing Provider   cholecalciferol (VITAMIN D3) 5,000 unit capsule TAKE 1 CAPSULE BY MOUTH ONCE DAILY 8/16/17  Yes Historical Provider   ferrous sulfate 324 mg (65 mg iron) tablet TAKE 1 TABLET ONCE A DAY ORALLY 30 DAY(S) 7/22/17  Yes Historical Provider   enalapril (VASOTEC) 10 mg tablet TAKE 1 TABLET BY MOUTH EVERY DAY ONCE A DAY ORALLY 90 DAYS 8/10/17  Yes Historical Provider   NOVOLOG MIX 70-30 FLEXPEN 100 unit/mL (70-30) inpn INYECTA 35 UNIDADES CON DESAYUNO,40 UNIDADES CON ALMUERZO,Y 35 UNIDADES CON LA TANI 3 VECES CADA GABRIELA 8/4/17  Yes Historical Provider   insulin glargine (TOUJEO SOLOSTAR) 300 unit/mL (1.5 mL) inpn 72 Units by SubCUTAneous route nightly. Yes Historical Provider   simvastatin (ZOCOR) 20 mg tablet Take 20 mg by mouth nightly. Yes Historical Provider   metFORMIN (GLUCOPHAGE) 1,000 mg tablet Take 1,000 mg by mouth two (2) times daily (with meals). Yes Historical Provider   aspirin 81 mg chewable tablet Take 81 mg by mouth every morning. Historical Provider        Review of Systems:  A comprehensive review of systems was negative except for that written in the History of Present Illness. Objective:     Vitals:    10/10/17 0912   BP: 132/58   Pulse: 75   Resp: 16   Temp: 97.9 °F (36.6 °C)   SpO2: 99%   Weight: 82.1 kg (181 lb)   Height: 5' 2\" (1.575 m)       Physical Exam:  Deferred    Assessment:     Active Problems:    * No active hospital problems. *     Menorrhagia with fibroids    Plan:     Procedure(s) (LRB):  DAVINCI TOTAL LAPAROSCOPIC HYSTERECTOMY, BILATERAL SALPINGECTOMY (N/A)  Discussed the risks of surgery including the risks of bleeding, infection, deep vein thrombosis, and surgical injuries to internal organs including but not limited to the bowels, bladder, rectum, and female reproductive organs. The patient understands the risks; any and all questions were answered to the patient's satisfaction.     Signed By:  Narinder Wynn MD     October 10, 2017

## 2017-10-11 VITALS
OXYGEN SATURATION: 99 % | HEIGHT: 62 IN | BODY MASS INDEX: 33.31 KG/M2 | SYSTOLIC BLOOD PRESSURE: 153 MMHG | TEMPERATURE: 98.2 F | DIASTOLIC BLOOD PRESSURE: 70 MMHG | WEIGHT: 181 LBS | RESPIRATION RATE: 20 BRPM | HEART RATE: 85 BPM

## 2017-10-11 LAB
BASOPHILS # BLD: 0 K/UL (ref 0–0.1)
BASOPHILS NFR BLD: 0 % (ref 0–1)
EOSINOPHIL # BLD: 0 K/UL (ref 0–0.4)
EOSINOPHIL NFR BLD: 0 % (ref 0–7)
ERYTHROCYTE [DISTWIDTH] IN BLOOD BY AUTOMATED COUNT: 14.8 % (ref 11.5–14.5)
GLUCOSE BLD STRIP.AUTO-MCNC: 209 MG/DL (ref 65–100)
GLUCOSE BLD STRIP.AUTO-MCNC: 254 MG/DL (ref 65–100)
HCT VFR BLD AUTO: 24.3 % (ref 35–47)
HGB BLD-MCNC: 7.8 G/DL (ref 11.5–16)
LYMPHOCYTES # BLD: 1.6 K/UL (ref 0.8–3.5)
LYMPHOCYTES NFR BLD: 19 % (ref 12–49)
MCH RBC QN AUTO: 25.7 PG (ref 26–34)
MCHC RBC AUTO-ENTMCNC: 32.1 G/DL (ref 30–36.5)
MCV RBC AUTO: 80.2 FL (ref 80–99)
MONOCYTES # BLD: 0.9 K/UL (ref 0–1)
MONOCYTES NFR BLD: 10 % (ref 5–13)
NEUTS SEG # BLD: 6.1 K/UL (ref 1.8–8)
NEUTS SEG NFR BLD: 71 % (ref 32–75)
PLATELET # BLD AUTO: 367 K/UL (ref 150–400)
RBC # BLD AUTO: 3.03 M/UL (ref 3.8–5.2)
SERVICE CMNT-IMP: ABNORMAL
SERVICE CMNT-IMP: ABNORMAL
WBC # BLD AUTO: 8.6 K/UL (ref 3.6–11)

## 2017-10-11 PROCEDURE — 74011250637 HC RX REV CODE- 250/637: Performed by: SPECIALIST

## 2017-10-11 PROCEDURE — 74011250636 HC RX REV CODE- 250/636: Performed by: SPECIALIST

## 2017-10-11 PROCEDURE — 99218 HC RM OBSERVATION: CPT

## 2017-10-11 PROCEDURE — 36415 COLL VENOUS BLD VENIPUNCTURE: CPT | Performed by: SPECIALIST

## 2017-10-11 PROCEDURE — 85025 COMPLETE CBC W/AUTO DIFF WBC: CPT | Performed by: SPECIALIST

## 2017-10-11 PROCEDURE — 74011636637 HC RX REV CODE- 636/637: Performed by: SPECIALIST

## 2017-10-11 PROCEDURE — 82962 GLUCOSE BLOOD TEST: CPT

## 2017-10-11 RX ORDER — OXYCODONE HYDROCHLORIDE 5 MG/1
5 TABLET ORAL
Qty: 30 TAB | Refills: 0 | Status: SHIPPED | OUTPATIENT
Start: 2017-10-11

## 2017-10-11 RX ORDER — IBUPROFEN 600 MG/1
600 TABLET ORAL
Qty: 30 TAB | Refills: 0 | Status: SHIPPED | OUTPATIENT
Start: 2017-10-11

## 2017-10-11 RX ADMIN — INSULIN LISPRO 5 UNITS: 100 INJECTION, SOLUTION INTRAVENOUS; SUBCUTANEOUS at 03:19

## 2017-10-11 RX ADMIN — INSULIN LISPRO 3 UNITS: 100 INJECTION, SOLUTION INTRAVENOUS; SUBCUTANEOUS at 12:10

## 2017-10-11 RX ADMIN — CEFAZOLIN 2 G: 1 INJECTION, POWDER, FOR SOLUTION INTRAMUSCULAR; INTRAVENOUS; PARENTERAL at 12:39

## 2017-10-11 RX ADMIN — OXYCODONE HYDROCHLORIDE 5 MG: 5 TABLET ORAL at 12:43

## 2017-10-11 RX ADMIN — METFORMIN HYDROCHLORIDE 1000 MG: 500 TABLET, FILM COATED ORAL at 07:13

## 2017-10-11 RX ADMIN — OXYCODONE HYDROCHLORIDE 5 MG: 5 TABLET ORAL at 05:54

## 2017-10-11 RX ADMIN — SODIUM CHLORIDE, SODIUM LACTATE, POTASSIUM CHLORIDE, AND CALCIUM CHLORIDE 150 ML/HR: 600; 310; 30; 20 INJECTION, SOLUTION INTRAVENOUS at 05:53

## 2017-10-11 RX ADMIN — IBUPROFEN 600 MG: 200 TABLET, FILM COATED ORAL at 09:45

## 2017-10-11 RX ADMIN — CEFAZOLIN 2 G: 1 INJECTION, POWDER, FOR SOLUTION INTRAMUSCULAR; INTRAVENOUS; PARENTERAL at 04:43

## 2017-10-11 NOTE — PROGRESS NOTES
Bedside and Verbal shift change report given to Homero Nunez RN (oncoming nurse) by Zina Mack RN (offgoing nurse). Report included the following information SBAR, Kardex, OR Summary, Procedure Summary, Intake/Output, MAR, Accordion and Recent Results.

## 2017-10-11 NOTE — DISCHARGE INSTRUCTIONS
Histerectomía laparoscópica: Major Lo en el hogar - [ Laparoscopic Hysterectomy: What to Expect at [facility] ]  Edwards recuperación    Nat Uatsdin es kailey cirugía para extraer el Adell Snellen. En algunos casos, también se extraen los ovarios y las trompas de [place]op[place] al [employer]. Usted puede esperar sentirse mejor y más saad cada día, aunque es posible que necesite analgésicos (medicamentos para el dolor) por kailey o Pierz. Es posible que se canse con facilidad o que tenga menos energía de lo habitual. El cansancio podría durar varias semanas después de la cirugía. Probablemente notará que edwards abdomen está hinchado. Lazy Y U es común. La hinchazón tardará varias semanas en desaparecer. Nicola vez necesite entre 4 y 6 semanas hasta que se recupere por completo. Es importante que evite levantar objetos jono la recuperación para que pueda sanar. Esta hoja de [employer] idea general de cuánto tiempo tardará en recuperarse. Trista cada persona se recupera a un ritmo diferente. Siga los pasos a continuación para mejorar tan rápido muriel sea posible. ¿Cómo puede cuidarse en el [facility]? Actividad  · Descanse cuando se sienta cansada. · Alver Galas. Caminar es kailey buena opción. · Deje que sane la arlen. No se mueva con rapidez ni levante nada pesado hasta que se encuentre mejor. · Puede ducharse entre 24 y 50 horas después de la [place], si edwards médico lo [place]. Seque la incisión con toques suaves de toalla. No tome un baño de inmersión jono las primeras 2 semanas, o hasta que edwards médico lo apruebe. · Pregúntele a edwards médico cuándo puede tener [employer]. Alimentación  · Puede seguir edwards dieta normal. Si tiene [employer], pruebe alimentos suaves y bajos en grasa, muriel arroz sin condimentar, khris asado, pan maura y yogur. · Si no evacua los intestinos con regularidad magi después de la cirugía, trate de evitar el estreñimiento y hacer esfuerzos. Jeannette abundante agua.  Edwards médico podría sugerirle que tome fibra, un ablandador de heces o un laxante [place]. Medicamentos  · Edwards médico le dirá si puede volver a ashley juju medicamentos y cuándo puede volver a hacerlo. También le dará indicaciones sobre cualquier medicamento nuevo que deba ashley usted. · Si niels medicamentos que previenen la formación de coágulos de Hemal, muriel warfarina (Coumadin), clopidogrel (Plavix) o aspirina, asegúrese de hablar con edwards médico. Él o luba le dirá si debe volver a ashley estos medicamentos y en qué momento. Asegúrese de que entiende exactamente lo que el médico quiere que mary. · Sea alexandra con los medicamentos. Indira y siga todas las instrucciones de la [place]. ¨ Si el médico le recetó analgésicos (medicamentos para el dolor), tómelos según las indicaciones. ¨ Si no está tomando un analgésico recetado, pregúntele a edwards médico si puede ashley kalen de [employer]. · Si edwards médico le recetó antibióticos, tómelos según las indicaciones. No deje de tomarlos solo porque se sienta mejor. Debe ashley todos los antibióticos hasta terminarlos. Cuidado de la incisión  · Es posible que tenga puntos de sutura sobre los mario (incisiones) que el médico le hizo en el abdomen. · Si tiene tiras de cinta adhesiva sobre el yasmin (incisión) que hizo el médico, déjeselas puestas kailey semana o hasta que se caigan por sí solas. · Lave la arlen a diario con agua tibia Ryan y séquela con toques suaves de toalla. No use agua oxigenada ni alcohol. Pueden retrasar la sanación. · Puede cubrir la arlen con kailey venda de gasa si le sale líquido o esta roza contra la ropa. · Cambie la venda todos los días. Otras instrucciones  · Podría tener un leve sangrado vaginal. Use toallas sanitarias si es necesario. No se mary lavados vaginales ni utilice tampones. · No tenga relaciones sexuales hasta que edwards médico lo apruebe. La atención de seguimiento es kailey parte clave de edwards tratamiento y seguridad.  Asegúrese de hacer y acudir a todas las citas, y llame a edwards médico si está teniendo problemas. También es kailey buena idea saber los resultados de juju exámenes y mantener kailey lista de los medicamentos que niels. ¿Cuándo debe pedir ayuda? Llame al 911 en cualquier momento que considere que puede necesitar atención de [place]. Por ejemplo, llame si:  · Se desmayó (perdió el conocimiento). · Tiene dolor repentino en el pecho y falta de [place], o tose ankit. Llame a edwards médico ahora mismo o busque atención médica inmediata si:  · Tiene sangrado vaginal intenso. Echo significa que usted empapa juju toallas sanitarias habituales cada hora jono 2 horas o más. · Tiene dolor repentino e intenso en el abdomen o en la pelvis. · Tiene puntos de sutura flojos o se abre la incisión. · Tiene señales de infección, tales muriel:  ¨ Aumento del dolor, la hinchazón, la temperatura o el enrojecimiento. ¨ Vetas kerr que salen de la incisión. ¨ Pus que sale de la incisión. ¨ Ganglios linfáticos inflamados en el isael, las axilas o la jose. Delmus Millet. Preste especial atención a los cambios en edwards david y asegúrese de comunicarse con edwards médico si:  · No mejora muriel se esperaba. ¿Dónde puede encontrar más información en inglés? [place] Noss a http://tyron-araceli.info/. Sixto Duran Q131 en la búsqueda para aprender más acerca de \"Histerectomía laparoscópica: [facility]a [facility] el [facility] - [ Laparoscopic Hysterectomy: What to Expect at [facility] ]. \"  Revisado: 23 noviembre, 2016  Versión del contenido: 11.3  © 2322-0603 [employer]. Las instrucciones de cuidado fueron adaptadas bajo licencia por Good [facility] [employer] (which disclaims liability or warranty for this information). Si usted tiene [facility] afección médica o sobre estas instrucciones, siempre pregunte a edwards profesional de david. [employer] niega toda garantía o responsabilidad por edwards uso de esta información.

## 2017-10-11 NOTE — ANESTHESIA POSTPROCEDURE EVALUATION
Post-Anesthesia Evaluation and Assessment    Patient: Sulma Marshall MRN: [id number]  SSN: [id number]    YOB: 1970  Age: 56 y.o. Sex: female       Cardiovascular Function/Vital Signs  Visit Vitals    /70 (BP 1 Location: Right arm, BP Patient Position: At rest)    Pulse 82    Temp 36.8 °C (98.3 °F)    Resp 20    Ht 5' 2\" (1.575 m)    Wt 82.1 kg (181 lb)    SpO2 99%    BMI 33.11 kg/m2       Patient is status post general anesthesia for Procedure(s):  DAVINCI SUPRACERVICAL LAPAROSCOPIC HYSTERECTOMY, BILATERAL SALPINGECTOMY; RIGHT OOPHERECTOMY. Nausea/Vomiting: None    Postoperative hydration reviewed and adequate. Pain:  Pain Scale 1: Numeric (0 - 10) (10/11/17 0554)  Pain Intensity 1: 6 (10/11/17 0554)   Managed    Neurological Status:   Neuro (WDL): Exceptions to WDL (drowsy) (10/10/17 1345)  Neuro  LUE Motor Response: Purposeful (10/10/17 1522)  LLE Motor Response: Purposeful (10/10/17 1522)  RUE Motor Response: Purposeful (10/10/17 1522)  RLE Motor Response: Purposeful (10/10/17 1522)   At baseline    Mental Status and Level of Consciousness: Arousable    Pulmonary Status:   O2 Device: Room air (10/11/17 0445)   Adequate oxygenation and airway patent    Complications related to anesthesia: None    Post-anesthesia assessment completed.  No concerns    Signed By: Gonsalo Emerson MD     October 11, 2017

## 2017-10-11 NOTE — PROGRESS NOTES
Gynecology Progress Note    Jose A Orellana    She is without significant complaints. Pain controlled on current medication. Voiding without difficulty. Patient is passing flatus. She is is tolerating her diet.     Vitals:  Temp (24hrs), Av.2 °F (36.8 °C), Min:97.5 °F (36.4 °C), Max:99 °F (37.2 °C)      Visit Vitals    /69 (BP 1 Location: Right arm, BP Patient Position: At rest)    Pulse 98    Temp 98.4 °F (36.9 °C)    Resp 20    Ht 5' 2\" (1.575 m)    Wt 82.1 kg (181 lb)    SpO2 97%    BMI 33.11 kg/m2        Intake and Output:   Current shift:    Last 3 completed shifts: 10/09 1901 - 10/11 07  In: 3812.5 [I.V.:3812.5]  Out: 1165 [FEXXK:5614]      Exam:  General: alert, cooperative, no distress, appears stated age     Lung: clear to auscultation bilaterally     Heart: regular rate and rhythm, S1, S2 normal, no murmur, click, rub or gallop     Abdomen: incision c/d/i     Extremities: extremities normal, atraumatic, no cyanosis or edema, no edema      Labs:   Lab Results   Component Value Date/Time    WBC 8.6 10/11/2017 04:50 AM    WBC 11.1 10/03/2017 02:40 PM    HGB 7.8 10/11/2017 04:50 AM    HGB 11.4 10/03/2017 02:40 PM    HCT 24.3 10/11/2017 04:50 AM    HCT 35.3 10/03/2017 02:40 PM    PLATELET 957  04:50 AM    PLATELET 198  02:40 PM       Recent Results (from the past 24 hour(s))   HCG URINE, QL. - POC    Collection Time: 10/10/17  9:13 AM   Result Value Ref Range    Pregnancy test,urine (POC) NEGATIVE  NEG     GLUCOSE, POC    Collection Time: 10/10/17  9:22 AM   Result Value Ref Range    Glucose (POC) 88 65 - 100 mg/dL    Performed by Romero Juárez    GLUCOSE, POC    Collection Time: 10/10/17  1:10 PM   Result Value Ref Range    Glucose (POC) 193 (H) 65 - 100 mg/dL    Performed by [id number] Kishor Saleh, POC    Collection Time: 10/10/17  9:07 PM   Result Value Ref Range    Glucose (POC) 327 (H) 65 - 100 mg/dL    Performed by [address], POC    Collection Time: 10/11/17  3:13 AM   Result Value Ref Range    Glucose (POC) 254 (H) 65 - 100 mg/dL    Performed by JEREMIE LOPEZ    CBC WITH AUTOMATED DIFF    Collection Time: 10/11/17  4:50 AM   Result Value Ref Range    WBC 8.6 3.6 - 11.0 K/uL    RBC 3.03 (L) 3.80 - 5.20 M/uL    HGB 7.8 (L) 11.5 - 16.0 g/dL    HCT 24.3 (L) 35.0 - 47.0 %    MCV 80.2 80.0 - 99.0 FL    MCH 25.7 (L) 26.0 - 34.0 PG    MCHC 32.1 30.0 - 36.5 g/dL    RDW 14.8 (H) 11.5 - 14.5 %    PLATELET 658 060 - 092 K/uL    NEUTROPHILS 71 32 - 75 %    LYMPHOCYTES 19 12 - 49 %    MONOCYTES 10 5 - 13 %    EOSINOPHILS 0 0 - 7 %    BASOPHILS 0 0 - 1 %    ABS. NEUTROPHILS 6.1 1.8 - 8.0 K/UL    ABS. LYMPHOCYTES 1.6 0.8 - 3.5 K/UL    ABS. MONOCYTES 0.9 0.0 - 1.0 K/UL    ABS. EOSINOPHILS 0.0 0.0 - 0.4 K/UL    ABS.  BASOPHILS 0.0 0.0 - 0.1 K/UL       Assesment: POD#1  Da Armin laparoscopic supracervical   hysterectomy with right salpingo-oophorectomy and left salpingectomy  Doing well    Plan: Discharge home today with: Medications: medications prior to admission Follow up: 2 weeks Diet: as tolerated Activity: no heavy lifting

## 2017-10-11 NOTE — PROGRESS NOTES
I have reviewed discharge instructions with the patient and spouse. The patient and spouse verbalized understanding.  IV removed from L wrist.

## 2017-10-11 NOTE — DIABETES MGMT
DTC Consult Note    Recommendations/ Comments:     Consult received for:  [x]             Assessment of home management                 Chart reviewed and initial evaluation complete on Chandrakant Juan. Pt does not check BS at home. She states to take all her DM medications and that her last A1c was 8%. She does have a PCP that she visits regularly. Patient is a 56 y.o. female with hx Type 2 Diabetes on Novolog 70/30 35 units before breakfast, 40 units before lunch and 35 units before dinner and Metformin 1000 mg BID at home. She reports to take all her medications daily and to eat 3 meals a day, avoiding concentrated sweets. Pt is aware of her A1c target and has been working with her PCP to lower it. Assessed and instructed patient on the following:   ·  interpretation of lab results, blood sugar goals, A1c, I0D target, complications of diabetes mellitus, hypoglycemia prevention and treatment and nutrition    Encouraged the following:   · dietary modifications: Plate method, regular blood sugar monitorin-3 times daily, follow up with PCP    Provided patient with the following: [x]             Survival skills education materials               [x]             Insulin education materials               []             CHO counting education materials               []             Outpatient DTC contact number               [x]             Glucometer- Accu Chek Guide                Discussed with patient and  need for follow up appointment for diabetes management after discharge. A1c:   No results found for: HBA1C, HGBE8, [id number]    Recent Glucose Results:   Lab Results   Component Value Date/Time    GLUCPOC 209 (H) 10/11/2017 09:48 AM    GLUCPOC 254 (H) 10/11/2017 03:13 AM    GLUCPOC 327 (H) 10/10/2017 09:07 PM        Lab Results   Component Value Date/Time    Creatinine 0.78 10/03/2017 02:40 PM     Estimated Creatinine Clearance: 89.5 mL/min (based on Cr of 0.78).     Active Orders Diet    DIET GI LITE (POST SURGICAL)        PO intake: No data found. Current hospital DM medication: Lantus 61 units and Humalog for correction, normal sensitivity scale    Will continue to follow as needed. Thank you.   Anil Michel, [address]  Ph: [phone]

## 2017-10-13 ENCOUNTER — APPOINTMENT (OUTPATIENT)
Dept: GENERAL RADIOLOGY | Age: 47
DRG: 863 | End: 2017-10-13
Attending: EMERGENCY MEDICINE
Payer: COMMERCIAL

## 2017-10-13 ENCOUNTER — APPOINTMENT (OUTPATIENT)
Dept: CT IMAGING | Age: 47
DRG: 863 | End: 2017-10-13
Attending: EMERGENCY MEDICINE
Payer: COMMERCIAL

## 2017-10-13 ENCOUNTER — HOSPITAL ENCOUNTER (INPATIENT)
Age: 47
LOS: 5 days | Discharge: HOME | DRG: 863 | End: 2017-10-18
Attending: EMERGENCY MEDICINE | Admitting: SPECIALIST
Payer: COMMERCIAL

## 2017-10-13 DIAGNOSIS — N94.89 PELVIC HEMATOMA IN FEMALE: Primary | ICD-10-CM

## 2017-10-13 LAB
ALBUMIN SERPL-MCNC: 3.2 G/DL (ref 3.5–5)
ALBUMIN/GLOB SERPL: 0.7 {RATIO} (ref 1.1–2.2)
ALP SERPL-CCNC: 139 U/L (ref 45–117)
ALT SERPL-CCNC: 52 U/L (ref 12–78)
ANION GAP SERPL CALC-SCNC: 11 MMOL/L (ref 5–15)
APPEARANCE UR: CLEAR
AST SERPL-CCNC: 47 U/L (ref 15–37)
BACTERIA URNS QL MICRO: NEGATIVE /HPF
BASOPHILS # BLD: 0 K/UL (ref 0–0.1)
BASOPHILS NFR BLD: 0 % (ref 0–1)
BILIRUB SERPL-MCNC: 0.9 MG/DL (ref 0.2–1)
BILIRUB UR QL: NEGATIVE
BUN SERPL-MCNC: 8 MG/DL (ref 6–20)
BUN/CREAT SERPL: 16 (ref 12–20)
CALCIUM SERPL-MCNC: 9 MG/DL (ref 8.5–10.1)
CHLORIDE SERPL-SCNC: 97 MMOL/L (ref 97–108)
CO2 SERPL-SCNC: 27 MMOL/L (ref 21–32)
COLOR UR: ABNORMAL
CREAT SERPL-MCNC: 0.51 MG/DL (ref 0.55–1.02)
EOSINOPHIL # BLD: 0.4 K/UL (ref 0–0.4)
EOSINOPHIL NFR BLD: 3 % (ref 0–7)
EPITH CASTS URNS QL MICRO: ABNORMAL /LPF
ERYTHROCYTE [DISTWIDTH] IN BLOOD BY AUTOMATED COUNT: 15.2 % (ref 11.5–14.5)
GLOBULIN SER CALC-MCNC: 4.8 G/DL (ref 2–4)
GLUCOSE BLD STRIP.AUTO-MCNC: 166 MG/DL (ref 65–100)
GLUCOSE SERPL-MCNC: 110 MG/DL (ref 65–100)
GLUCOSE UR STRIP.AUTO-MCNC: NEGATIVE MG/DL
HCT VFR BLD AUTO: 24 % (ref 35–47)
HGB BLD-MCNC: 7.7 G/DL (ref 11.5–16)
HGB UR QL STRIP: ABNORMAL
HYALINE CASTS URNS QL MICRO: ABNORMAL /LPF (ref 0–5)
KETONES UR QL STRIP.AUTO: ABNORMAL MG/DL
LACTATE SERPL-SCNC: 1.1 MMOL/L (ref 0.4–2)
LEUKOCYTE ESTERASE UR QL STRIP.AUTO: NEGATIVE
LYMPHOCYTES # BLD: 1.8 K/UL (ref 0.8–3.5)
LYMPHOCYTES NFR BLD: 12 % (ref 12–49)
MAGNESIUM SERPL-MCNC: 1.8 MG/DL (ref 1.6–2.4)
MCH RBC QN AUTO: 25.6 PG (ref 26–34)
MCHC RBC AUTO-ENTMCNC: 32.1 G/DL (ref 30–36.5)
MCV RBC AUTO: 79.7 FL (ref 80–99)
MONOCYTES # BLD: 0.9 K/UL (ref 0–1)
MONOCYTES NFR BLD: 6 % (ref 5–13)
NEUTS SEG # BLD: 12 K/UL (ref 1.8–8)
NEUTS SEG NFR BLD: 79 % (ref 32–75)
NITRITE UR QL STRIP.AUTO: NEGATIVE
NRBC # BLD: 0.08 K/UL (ref 0–0.01)
NRBC BLD-RTO: 0.5 PER 100 WBC
PH UR STRIP: 7.5 [PH] (ref 5–8)
PLATELET # BLD AUTO: 435 K/UL (ref 150–400)
POTASSIUM SERPL-SCNC: 3.6 MMOL/L (ref 3.5–5.1)
PROT SERPL-MCNC: 8 G/DL (ref 6.4–8.2)
PROT UR STRIP-MCNC: NEGATIVE MG/DL
RBC # BLD AUTO: 3.01 M/UL (ref 3.8–5.2)
RBC #/AREA URNS HPF: ABNORMAL /HPF (ref 0–5)
SERVICE CMNT-IMP: ABNORMAL
SODIUM SERPL-SCNC: 135 MMOL/L (ref 136–145)
SP GR UR REFRACTOMETRY: 1 (ref 1–1.03)
TROPONIN I SERPL-MCNC: <0.04 NG/ML
UA: UC IF INDICATED,UAUC: ABNORMAL
UROBILINOGEN UR QL STRIP.AUTO: 0.2 EU/DL (ref 0.2–1)
WBC # BLD AUTO: 15.1 K/UL (ref 3.6–11)
WBC NRBC COR # BLD: ABNORMAL 10*3/UL
WBC URNS QL MICRO: ABNORMAL /HPF (ref 0–4)

## 2017-10-13 PROCEDURE — 84484 ASSAY OF TROPONIN QUANT: CPT | Performed by: EMERGENCY MEDICINE

## 2017-10-13 PROCEDURE — 99284 EMERGENCY DEPT VISIT MOD MDM: CPT

## 2017-10-13 PROCEDURE — 81001 URINALYSIS AUTO W/SCOPE: CPT | Performed by: EMERGENCY MEDICINE

## 2017-10-13 PROCEDURE — 83605 ASSAY OF LACTIC ACID: CPT | Performed by: EMERGENCY MEDICINE

## 2017-10-13 PROCEDURE — 74011250637 HC RX REV CODE- 250/637: Performed by: SPECIALIST

## 2017-10-13 PROCEDURE — 96374 THER/PROPH/DIAG INJ IV PUSH: CPT

## 2017-10-13 PROCEDURE — 36415 COLL VENOUS BLD VENIPUNCTURE: CPT | Performed by: EMERGENCY MEDICINE

## 2017-10-13 PROCEDURE — 74011250636 HC RX REV CODE- 250/636: Performed by: EMERGENCY MEDICINE

## 2017-10-13 PROCEDURE — 93005 ELECTROCARDIOGRAM TRACING: CPT

## 2017-10-13 PROCEDURE — 87040 BLOOD CULTURE FOR BACTERIA: CPT | Performed by: EMERGENCY MEDICINE

## 2017-10-13 PROCEDURE — 74177 CT ABD & PELVIS W/CONTRAST: CPT

## 2017-10-13 PROCEDURE — 82962 GLUCOSE BLOOD TEST: CPT

## 2017-10-13 PROCEDURE — 80053 COMPREHEN METABOLIC PANEL: CPT | Performed by: EMERGENCY MEDICINE

## 2017-10-13 PROCEDURE — 83735 ASSAY OF MAGNESIUM: CPT | Performed by: EMERGENCY MEDICINE

## 2017-10-13 PROCEDURE — 74011000258 HC RX REV CODE- 258: Performed by: EMERGENCY MEDICINE

## 2017-10-13 PROCEDURE — 71010 XR CHEST PORT: CPT

## 2017-10-13 PROCEDURE — 85025 COMPLETE CBC W/AUTO DIFF WBC: CPT | Performed by: EMERGENCY MEDICINE

## 2017-10-13 PROCEDURE — 74011636320 HC RX REV CODE- 636/320: Performed by: EMERGENCY MEDICINE

## 2017-10-13 PROCEDURE — 65270000029 HC RM PRIVATE

## 2017-10-13 RX ORDER — SODIUM CHLORIDE 0.9 % (FLUSH) 0.9 %
5-10 SYRINGE (ML) INJECTION AS NEEDED
Status: DISCONTINUED | OUTPATIENT
Start: 2017-10-13 | End: 2017-10-18 | Stop reason: HOSPADM

## 2017-10-13 RX ORDER — ONDANSETRON 2 MG/ML
4 INJECTION INTRAMUSCULAR; INTRAVENOUS
Status: COMPLETED | OUTPATIENT
Start: 2017-10-13 | End: 2017-10-13

## 2017-10-13 RX ORDER — OXYCODONE HYDROCHLORIDE 5 MG/1
5 TABLET ORAL
Status: DISCONTINUED | OUTPATIENT
Start: 2017-10-13 | End: 2017-10-18 | Stop reason: HOSPADM

## 2017-10-13 RX ORDER — METFORMIN HYDROCHLORIDE 500 MG/1
1000 TABLET ORAL 2 TIMES DAILY WITH MEALS
Status: DISCONTINUED | OUTPATIENT
Start: 2017-10-16 | End: 2017-10-18 | Stop reason: HOSPADM

## 2017-10-13 RX ORDER — ATORVASTATIN CALCIUM 10 MG/1
20 TABLET, FILM COATED ORAL
Status: DISCONTINUED | OUTPATIENT
Start: 2017-10-13 | End: 2017-10-18 | Stop reason: HOSPADM

## 2017-10-13 RX ORDER — INSULIN GLARGINE 100 [IU]/ML
60 INJECTION, SOLUTION SUBCUTANEOUS
Status: DISCONTINUED | OUTPATIENT
Start: 2017-10-13 | End: 2017-10-14

## 2017-10-13 RX ORDER — ONDANSETRON 2 MG/ML
4 INJECTION INTRAMUSCULAR; INTRAVENOUS
Status: DISCONTINUED | OUTPATIENT
Start: 2017-10-13 | End: 2017-10-18 | Stop reason: HOSPADM

## 2017-10-13 RX ORDER — MORPHINE SULFATE 2 MG/ML
6 INJECTION, SOLUTION INTRAMUSCULAR; INTRAVENOUS
Status: COMPLETED | OUTPATIENT
Start: 2017-10-13 | End: 2017-10-13

## 2017-10-13 RX ORDER — INSULIN LISPRO 100 [IU]/ML
9 INJECTION, SOLUTION INTRAVENOUS; SUBCUTANEOUS
Status: DISCONTINUED | OUTPATIENT
Start: 2017-10-14 | End: 2017-10-18 | Stop reason: HOSPADM

## 2017-10-13 RX ORDER — SODIUM CHLORIDE, SODIUM LACTATE, POTASSIUM CHLORIDE, CALCIUM CHLORIDE 600; 310; 30; 20 MG/100ML; MG/100ML; MG/100ML; MG/100ML
150 INJECTION, SOLUTION INTRAVENOUS CONTINUOUS
Status: DISCONTINUED | OUTPATIENT
Start: 2017-10-13 | End: 2017-10-18 | Stop reason: HOSPADM

## 2017-10-13 RX ORDER — HYDROMORPHONE HYDROCHLORIDE 1 MG/ML
1 INJECTION, SOLUTION INTRAMUSCULAR; INTRAVENOUS; SUBCUTANEOUS
Status: DISCONTINUED | OUTPATIENT
Start: 2017-10-13 | End: 2017-10-18 | Stop reason: HOSPADM

## 2017-10-13 RX ORDER — ZOLPIDEM TARTRATE 5 MG/1
5 TABLET ORAL
Status: DISCONTINUED | OUTPATIENT
Start: 2017-10-13 | End: 2017-10-18 | Stop reason: HOSPADM

## 2017-10-13 RX ORDER — ENALAPRIL MALEATE 5 MG/1
10 TABLET ORAL DAILY
Status: DISCONTINUED | OUTPATIENT
Start: 2017-10-14 | End: 2017-10-18 | Stop reason: HOSPADM

## 2017-10-13 RX ORDER — SODIUM CHLORIDE 0.9 % (FLUSH) 0.9 %
10 SYRINGE (ML) INJECTION
Status: COMPLETED | OUTPATIENT
Start: 2017-10-13 | End: 2017-10-13

## 2017-10-13 RX ORDER — SODIUM CHLORIDE 9 MG/ML
50 INJECTION, SOLUTION INTRAVENOUS
Status: COMPLETED | OUTPATIENT
Start: 2017-10-13 | End: 2017-10-16

## 2017-10-13 RX ORDER — SODIUM CHLORIDE 0.9 % (FLUSH) 0.9 %
5-10 SYRINGE (ML) INJECTION EVERY 8 HOURS
Status: DISCONTINUED | OUTPATIENT
Start: 2017-10-14 | End: 2017-10-18 | Stop reason: HOSPADM

## 2017-10-13 RX ORDER — SODIUM CHLORIDE 0.9 % (FLUSH) 0.9 %
5-10 SYRINGE (ML) INJECTION AS NEEDED
Status: DISCONTINUED | OUTPATIENT
Start: 2017-10-13 | End: 2017-10-16 | Stop reason: SDUPTHER

## 2017-10-13 RX ORDER — ONDANSETRON 2 MG/ML
INJECTION INTRAMUSCULAR; INTRAVENOUS
Status: DISPENSED
Start: 2017-10-13 | End: 2017-10-14

## 2017-10-13 RX ADMIN — ONDANSETRON 4 MG: 2 INJECTION INTRAMUSCULAR; INTRAVENOUS at 21:48

## 2017-10-13 RX ADMIN — MORPHINE SULFATE 6 MG: 2 INJECTION, SOLUTION INTRAMUSCULAR; INTRAVENOUS at 21:39

## 2017-10-13 RX ADMIN — IOPAMIDOL 100 ML: 755 INJECTION, SOLUTION INTRAVENOUS at 19:46

## 2017-10-13 RX ADMIN — OXYCODONE HYDROCHLORIDE 5 MG: 5 TABLET ORAL at 23:34

## 2017-10-13 RX ADMIN — SODIUM CHLORIDE 50 ML/HR: 900 INJECTION, SOLUTION INTRAVENOUS at 19:46

## 2017-10-13 RX ADMIN — SODIUM CHLORIDE 1100 ML: 900 INJECTION, SOLUTION INTRAVENOUS at 21:28

## 2017-10-13 RX ADMIN — Medication 10 ML: at 19:46

## 2017-10-13 RX ADMIN — SODIUM CHLORIDE 3.38 G: 900 INJECTION, SOLUTION INTRAVENOUS at 21:30

## 2017-10-13 RX ADMIN — ATORVASTATIN CALCIUM 20 MG: 10 TABLET, FILM COATED ORAL at 23:34

## 2017-10-13 NOTE — ED PROVIDER NOTES
[facility] [address]  EMERGENCY DEPARTMENT HISTORY AND PHYSICAL EXAM       Date of Service: 10/13/2017   Patient Name: Darrell Blackburn   YOB: 1970  Medical Record Number: [id number]    History of Presenting Illness     Chief Complaint   Patient presents with    Pelvic Pain     Recently had hysterectomy on october 10th, and has had uncontrolled pain of 8/10 in her pelvis and back. Was advised by on call surgeon to follow up in ER        History Provided By:  patient and spouse    Additional History:   Darrell Blackburn is a 56 y.o. female with PMhx significant for HTN, DM, s/p hysterectomy (10/10/2017) who presents ambulatory to the ED with cc of constant, gradually worsening post-operative R sided pelvic pain x yesterday, rated 8/10 in severity. She reports improvement of her pain with use of Roxicodone and prescription strength Motrin. She also c/o 2 days of generalized headache, waxing and waning subjective fevers, a small amount of post-surgical vaginal spotting, and an episode of near-syncope where her  caught her before she fell which occurred PTA. Lastly, she notes some constipation and states she has not moved her bowels since prior to surgery. Patient is unsure of any LOC. She specifically denies any N/V/D, dysuria, measured fevers, cough, or sputum production. She last took any analgesics around 14:00 today. Pt is still able to pass flatus and belch. She did not receive any epidural injections for her hysterectomy. Social Hx: - Tobacco, - EtOH, - Illicit Drugs    There are no other complaints, changes or physical findings at this time.     Primary Care Provider: Marciano Gunderson MD   Specialist: [address]    Past History     Past Medical History:   Past Medical History:   Diagnosis Date    Diabetes ([facility] [address]     Hypertension     Nausea & vomiting         Past Surgical History:   Past Surgical History:   Procedure Laterality Date    HX GYN  2011 REMOVAL OF FIBROIDS    HX ORTHOPAEDIC      CARPEL TUNNEL R HAND    HX ORTHOPAEDIC      L BEHIND THE  KNEE SURGERY         Family History:   Family History   Problem Relation Age of Onset    Diabetes Mother     No Known Problems Sister     No Known Problems Brother     No Known Problems Sister     No Known Problems Sister     No Known Problems Sister     No Known Problems Brother     No Known Problems Brother     No Known Problems Brother     No Known Problems Brother     Diabetes Brother         Social History:   Social History   Substance Use Topics    Smoking status: Never Smoker    Smokeless tobacco: Never Used    Alcohol use No        Allergies: Allergies   Allergen Reactions    Tylenol [Acetaminophen] Itching         Review of Systems   Review of Systems   Constitutional: Positive for fever. Negative for chills and fatigue. HENT: Positive for congestion. Negative for rhinorrhea and sore throat. Eyes: Negative for pain, discharge and visual disturbance. Respiratory: Negative for cough, chest tightness, shortness of breath and wheezing. Negative for sputum production   Cardiovascular: Negative for chest pain, palpitations and leg swelling. Gastrointestinal: Positive for constipation. Negative for abdominal pain, diarrhea, nausea and vomiting. Genitourinary: Positive for pelvic pain and vaginal bleeding (spotting). Negative for dysuria, frequency and hematuria. Musculoskeletal: Negative for arthralgias, back pain and myalgias. Skin: Negative for rash. Neurological: Positive for headaches. Negative for dizziness, weakness and light-headedness. Positive for near syncope. Negative for LOC or head trauma   Psychiatric/Behavioral: Negative. Physical Exam  Physical Exam   Constitutional: She is oriented to person, place, and time. She appears well-developed and well-nourished. No distress. HENT:   Head: Normocephalic and atraumatic.    Eyes: EOM are normal. Right eye exhibits no discharge. Left eye exhibits no discharge. No scleral icterus. Neck: Normal range of motion. Neck supple. No tracheal deviation present. No meningismus    Cardiovascular: Regular rhythm, normal heart sounds and intact distal pulses. Tachycardia present. Exam reveals no gallop and no friction rub. No murmur heard. Pulmonary/Chest: Effort normal and breath sounds normal. No respiratory distress. She has no wheezes. She has no rales. Abdominal: Soft. She exhibits no distension. There is tenderness (diffusely). There is no rebound and no guarding. Ecchymosis to R side of abd. No erythema surrounding incision sites. Musculoskeletal: Normal range of motion. She exhibits no edema. Lymphadenopathy:     She has no cervical adenopathy. Neurological: She is alert and oriented to person, place, and time. No focal neuro deficits   Skin: Skin is warm and dry. No rash noted. Psychiatric: She has a normal mood and affect. Nursing note and vitals reviewed. Medical Decision Making   I am the first provider for this patient. I reviewed the vital signs, available nursing notes, past medical history, past surgical history, family history and social history. Old Medical Records: Old medical records. Nursing notes. Provider Notes:   Differential includes post-operative infection, post-operative hematoma, UTI, pneumonia, anemia, ileus, obstruction  - CBC, CMP, lactate, UA  - Blood cultures  - CXR  - CT a/p to evaluate for post-surgical complication  - IVF, symptomatic management and reevaluate    Disposition: CT a/p consistent with pelvic hematoma. UA and CXR negative for infection. Patient receiving IVF, antibiotics. Plan to admit to gynecology for further management. Critical Care Time: none    ED Course:  6:47 PM   Initial assessment performed.  The patients presenting problems have been discussed, and they are in agreement with the care plan formulated and outlined with them. I have encouraged them to ask questions as they arise throughout their visit. Progress Notes:     Consult Note:  9:01 PM   Stefania Gaytan MD spoke with Jonathan Boyd MD,  Specialty: [facility] Par[facility]  Discussed pt's hx, disposition, and available diagnostic and imaging results. Reviewed care plans. Consultant agrees with plans as outlined. Dr. Kailee Bronson states he will evaluate and admit the patient. Written by Melony Koch, ED Scribe, as dictated by Stefania Gaytan MD.     Progress Note:  9:08 PM  Updated and counseled patient on ObGyn consult and recommended admission. Patient expresses understanding and agrees with plan. Addressed questions. Written by Melony Koch, ED Scribe, as dictated by Stefania Gaytna MD.       Procedures: none    Diagnostic Study Results     Labs -      Recent Results (from the past 12 hour(s))   EKG, 12 LEAD, INITIAL    Collection Time: 10/13/17  6:11 PM   Result Value Ref Range    Ventricular Rate 115 BPM    Atrial Rate 115 BPM    P-R Interval 138 ms    QRS Duration 90 ms    Q-T Interval 332 ms    QTC Calculation (Bezet) 459 ms    Calculated P Axis 44 degrees    Calculated R Axis 30 degrees    Calculated T Axis 22 degrees    Diagnosis       Sinus tachycardia  When compared with ECG of 03-OCT-2017 14:55,  Nonspecific T wave abnormality, worse in Inferior leads     CBC WITH AUTOMATED DIFF    Collection Time: 10/13/17  6:18 PM   Result Value Ref Range    WBC 15.1 (H) 3.6 - 11.0 K/uL    RBC 3.01 (L) 3.80 - 5.20 M/uL    HGB 7.7 (L) 11.5 - 16.0 g/dL    HCT 24.0 (L) 35.0 - 47.0 %    MCV 79.7 (L) 80.0 - 99.0 FL    MCH 25.6 (L) 26.0 - 34.0 PG    MCHC 32.1 30.0 - 36.5 g/dL    RDW 15.2 (H) 11.5 - 14.5 %    PLATELET 692 (H) 555 - 400 K/uL    NEUTROPHILS 79 (H) 32 - 75 %    LYMPHOCYTES 12 12 - 49 %    MONOCYTES 6 5 - 13 %    EOSINOPHILS 3 0 - 7 %    BASOPHILS 0 0 - 1 %    ABS. NEUTROPHILS 12.0 (H) 1.8 - 8.0 K/UL    ABS.  LYMPHOCYTES 1.8 0.8 - 3.5 K/UL ABS. MONOCYTES 0.9 0.0 - 1.0 K/UL    ABS. EOSINOPHILS 0.4 0.0 - 0.4 K/UL    ABS. BASOPHILS 0.0 0.0 - 0.1 K/UL   METABOLIC PANEL, COMPREHENSIVE    Collection Time: 10/13/17  6:18 PM   Result Value Ref Range    Sodium 135 (L) 136 - 145 mmol/L    Potassium 3.6 3.5 - 5.1 mmol/L    Chloride 97 97 - 108 mmol/L    CO2 27 21 - 32 mmol/L    Anion gap 11 5 - 15 mmol/L    Glucose 110 (H) 65 - 100 mg/dL    BUN 8 6 - 20 MG/DL    Creatinine 0.51 (L) 0.55 - 1.02 MG/DL    BUN/Creatinine ratio 16 12 - 20      GFR est AA >60 >60 ml/min/1.73m2    GFR est non-AA >60 >60 ml/min/1.73m2    Calcium 9.0 8.5 - 10.1 MG/DL    Bilirubin, total 0.9 0.2 - 1.0 MG/DL    ALT (SGPT) 52 12 - 78 U/L    AST (SGOT) 47 (H) 15 - 37 U/L    Alk. phosphatase 139 (H) 45 - 117 U/L    Protein, total 8.0 6.4 - 8.2 g/dL    Albumin 3.2 (L) 3.5 - 5.0 g/dL    Globulin 4.8 (H) 2.0 - 4.0 g/dL    A-G Ratio 0.7 (L) 1.1 - 2.2     LACTIC ACID    Collection Time: 10/13/17  6:18 PM   Result Value Ref Range    Lactic acid 1.1 0.4 - 2.0 MMOL/L   NUCLEATED RBC    Collection Time: 10/13/17  6:18 PM   Result Value Ref Range    NRBC 0.5 (H) 0  WBC    ABSOLUTE NRBC 0.08 (H) 0.00 - 0.01 K/uL    WBC CORRECTED FOR NR ADJUSTED FOR NUCLEATED RBC'S         Radiologic Studies -  The following have been ordered and reviewed:    CT Results  (Last 48 hours)               10/13/17 1946  CT ABD PELV W CONT Final result    Impression:  IMPRESSION:   High density collection cranial to the bladder at site of hysterectomy likely   representing postoperative hematoma. No drainable fluid collection           Narrative:  EXAM:  CT ABD PELV W CONT       INDICATION: hysterectomy on 10/10, now with fever and worsening ab/pelvic pain       COMPARISON: None       CONTRAST:  100 mL of Isovue-370. TECHNIQUE:    Following the uneventful intravenous administration of contrast, thin axial   images were obtained through the abdomen and pelvis.  Coronal and sagittal   reconstructions were generated. Oral contrast was not administered. CT dose   reduction was achieved through use of a standardized protocol tailored for this   examination and automatic exposure control for dose modulation. FINDINGS:    LUNG BASES: There is atelectasis at the lung bases   INCIDENTALLY IMAGED HEART AND MEDIASTINUM: Unremarkable. LIVER: Diffuse fatty infiltration   GALLBLADDER: Unremarkable. SPLEEN: No mass. PANCREAS: No mass or ductal dilatation. ADRENALS: Unremarkable. KIDNEYS: No mass, calculus, or hydronephrosis. STOMACH: Unremarkable. SMALL BOWEL: No dilatation or wall thickening. COLON: No dilatation or wall thickening. APPENDIX: Unremarkable. PERITONEUM: Small amount of gas anterior to the bladder postoperative   RETROPERITONEUM: No lymphadenopathy or aortic aneurysm. REPRODUCTIVE ORGANS: Uterus is surgically absent cranial to the bladder there is   a complex 7.6 x 13.4 x 10.0 cm high density collection likely representing   postoperative hemorrhage. A drainable fluid component however is not identified   URINARY BLADDER: No mass or calculus. BONES: No destructive bone lesion. ADDITIONAL COMMENTS: Postoperative changes in the anterior abdominal wall               CXR Results  (Last 48 hours)               10/13/17 2117  XR CHEST PORT Final result    Impression:  Impression:   1. Left basilar atelectasis           Narrative:  INDICATION:  recent hysterectomy, now with fever/cough        Exam: Portable chest 2111. Comparison: October 3, 2017. Findings: Cardiomediastinal silhouette is within normal limits. Pulmonary   vasculature is not engorged. Is atelectasis left base without focal   consolidation effusion or pneumothorax. Vital Signs-Reviewed the patient's vital signs.    Patient Vitals for the past 12 hrs:   Temp Pulse Resp BP SpO2   10/13/17 1807 99.3 °F (37.4 °C) (!) 120 22 131/69 98 %       Medications Given in the ED:  Medications   sodium chloride 0.9 % bolus infusion 500 mL (not administered)   sodium chloride (NS) flush 5-10 mL (not administered)   sodium chloride 0.9 % bolus infusion 1,000 mL (not administered)   piperacillin-tazobactam (ZOSYN) 3.375 g in 0.9% sodium chloride (MBP/ADV) 100 mL (not administered)   sodium chloride 0.9 % bolus infusion 1,100 mL (1,100 mL IntraVENous New Bag 10/13/17 2128)   oxyCODONE IR (ROXICODONE) tablet 5 mg (not administered)   lactated Ringers infusion (not administered)   piperacillin-tazobactam (ZOSYN) 3.375 g in 0.9% sodium chloride (MBP/ADV) 100 mL (not administered)   metFORMIN (GLUCOPHAGE) tablet 1,000 mg (not administered)   . PHARMACY TO SUBSTITUTE PER PROTOCOL (not administered)   enalapril (VASOTEC) tablet 10 mg (not administered)   insulin lispro protamine/insulin lispro (HUMALOG MIX 75/25) injection 35 Units (not administered)   morphine injection 6 mg (6 mg IntraVENous Given 10/13/17 2139)   0.9% sodium chloride infusion (50 mL/hr IntraVENous New Bag 10/13/17 1946)   iopamidol (ISOVUE-370) 76 % injection 100 mL (100 mL IntraVENous Given 10/13/17 1946)   sodium chloride (NS) flush 10 mL (10 mL IntraVENous Given 10/13/17 1946)       Pulse Oximetry Analysis - Normal 98% on RA     Cardiac Monitor:   Rate: 120  Rhythm: Sinus Tachycardia      EKG interpretation: (Preliminary) 1811  Rhythm: sinus tachycardia; and regular . Rate (approx.): 115; Axis: normal; CA interval: normal; QRS interval: normal ; ST/T wave: normal; Other findings: none. Written by TORREY De Luna, as dictated by Sunita Sykes MD.    Diagnosis   Clinical Impression:   1. Pelvic hematoma in female         Plan:  1. Admit to ObGyn. Admit Note:  9:03 PM  Pt is being admitted by Dr. Enrique Sarmiento, [address]. The results of their tests and reason(s) for their admission have been discussed with pt and/or available family. They convey agreement and understanding for the need to be admitted and for admission diagnosis. _______________________________   Attestations: This note is prepared by Bethanie Quiles, acting as Scribe for Colette Salguero MD.       The scribe's documentation has been prepared under my direction and personally reviewed by me in its entirety.  I confirm that the note above accurately reflects all work, treatment, procedures, and medical decision making performed by me.   _______________________________

## 2017-10-13 NOTE — IP AVS SNAPSHOT
[address] [facility] 
[phone] Patient: Catie Gonzalez MRN: [id number] Current Discharge Medication List  
  
START taking these medications Dose & Instructions Dispensing Information Comments Morning Noon Evening Bedtime  
 amoxicillin-clavulanate 875-125 mg per tablet Commonly known as:  AUGMENTIN Your last dose was: Your next dose is:    
   
   
 Dose:  1 Tab Take 1 Tab by mouth two (2) times a day. Quantity:  20 Tab Refills:  0 ASK your doctor about these medications Dose & Instructions Dispensing Information Comments Morning Noon Evening Bedtime  
 aspirin 81 mg chewable tablet Your last dose was: Your next dose is:    
   
   
 Dose:  81 mg Take 81 mg by mouth every morning. Refills:  0  
     
   
   
   
  
 cholecalciferol 5,000 unit capsule Commonly known as:  VITAMIN D3 Your last dose was: Your next dose is: TAKE 1 CAPSULE BY MOUTH ONCE DAILY Refills:  3  
     
   
   
   
  
 enalapril 10 mg tablet Commonly known as:  Francenia Big Your last dose was: Your next dose is: TAKE 1 TABLET BY MOUTH EVERY DAY ONCE A DAY ORALLY 90 DAYS Refills:  2  
     
   
   
   
  
 ferrous sulfate 324 mg (65 mg iron) tablet Your last dose was: Your next dose is: TAKE 1 TABLET ONCE A DAY ORALLY 30 DAY(S) Refills:  3  
     
   
   
   
  
 ibuprofen 600 mg tablet Commonly known as:  MOTRIN Your last dose was: Your next dose is:    
   
   
 Dose:  600 mg Take 1 Tab by mouth every six (6) hours as needed. Indications: Pain Quantity:  30 Tab Refills:  0  
     
   
   
   
  
 metFORMIN 1,000 mg tablet Commonly known as:  GLUCOPHAGE Your last dose was:     
   
Your next dose is:    
   
   
 Dose:  1000 mg  
 Take 1,000 mg by mouth two (2) times daily (with meals). Refills:  0 NovoLOG Mix 70-30 FlexPen 100 unit/mL (70-30) In Generic drug:  insulin aspart protamine/insulin aspart Your last dose was: Your next dose is:    
   
   
 INYECTA 35 UNIDADES CON DESAYUNO,40 UNIDADES CON ALMUERZO,Y 35 UNIDADES CON LA TANI 3 VECES CADA GABRIELA Refills:  1  
     
   
   
   
  
 oxyCODONE IR 5 mg immediate release tablet Commonly known as:  Leann Avina Your last dose was: Your next dose is:    
   
   
 Dose:  5 mg Take 1 Tab by mouth every six (6) hours as needed. Max Daily Amount: 20 mg.  
 Quantity:  30 Tab Refills:  0  
     
   
   
   
  
 simvastatin 20 mg tablet Commonly known as:  ZOCOR Your last dose was: Your next dose is:    
   
   
 Dose:  20 mg Take 20 mg by mouth nightly. Refills:  0  
     
   
   
   
  
 TOUJEO SOLOSTAR 300 unit/mL (1.5 mL) In Generic drug:  insulin glargine Your last dose was: Your next dose is:    
   
   
 Dose:  72 Units 72 Units by SubCUTAneous route nightly. Refills:  0 Where to Get Your Medications Information on where to get these meds will be given to you by the nurse or doctor. ! Ask your nurse or doctor about these medications  
  amoxicillin-clavulanate 875-125 mg per tablet

## 2017-10-13 NOTE — IP AVS SNAPSHOT
[address] [facility] 
[phone] Patient: Danielle Lei MRN: [id number] AOQ:9/11/3236 You are allergic to the following Allergen Reactions Tylenol (Acetaminophen) Itching Recent Documentation Height Weight BMI OB Status Smoking Status 1.575 m 84 kg 33.87 kg/m2 Having regular periods Never Smoker Unresulted Labs Order Current Status CULTURE, BLOOD, PAIRED Preliminary result TYPE + CROSSMATCH Preliminary result Emergency Contacts Name Discharge Info Relation Home Work Mobile Rudy Love DISCHARGE CAREGIVER [3] Spouse [3] [phone] About your hospitalization You were admitted on:  October 13, 2017 You last received care in the:  [facility] GENERAL SURGERY You were discharged on:  October 18, 2017 Unit phone number:  [phone] Why you were hospitalized Your primary diagnosis was:  Not on File Your diagnoses also included:  Pelvic Hematoma In Female Providers Seen During Your Hospitalizations Provider Role Specialty Primary office phone Bradley Montelongo MD Attending Provider Emergency Medicine [phone] Tremaine Alvarez MD Attending Provider Obstetrics & Gynecology [phone] Your Primary Care Physician (PCP) Primary Care Physician Office Phone Office Fax Gabby Cnadi [phone] Follow-up Information Follow up With Details Comments Contact Info Nicolle Geller MD On 10/24/2017 APPOINTMENT TIME: 9:00AM [address] 
[phone] Current Discharge Medication List  
  
START taking these medications Dose & Instructions Dispensing Information Comments Morning Noon Evening Bedtime  
 amoxicillin-clavulanate 875-125 mg per tablet Commonly known as:  AUGMENTIN  
   
 Your last dose was: Your next dose is:    
   
   
 Dose:  1 Tab Take 1 Tab by mouth two (2) times a day. Quantity:  20 Tab Refills:  0 ASK your doctor about these medications Dose & Instructions Dispensing Information Comments Morning Noon Evening Bedtime  
 aspirin 81 mg chewable tablet Your last dose was: Your next dose is:    
   
   
 Dose:  81 mg Take 81 mg by mouth every morning. Refills:  0  
     
   
   
   
  
 cholecalciferol 5,000 unit capsule Commonly known as:  VITAMIN D3 Your last dose was: Your next dose is: TAKE 1 CAPSULE BY MOUTH ONCE DAILY Refills:  3  
     
   
   
   
  
 enalapril 10 mg tablet Commonly known as:  Ninetta Cease Your last dose was: Your next dose is: TAKE 1 TABLET BY MOUTH EVERY DAY ONCE A DAY ORALLY 90 DAYS Refills:  2  
     
   
   
   
  
 ferrous sulfate 324 mg (65 mg iron) tablet Your last dose was: Your next dose is: TAKE 1 TABLET ONCE A DAY ORALLY 30 DAY(S) Refills:  3  
     
   
   
   
  
 ibuprofen 600 mg tablet Commonly known as:  MOTRIN Your last dose was: Your next dose is:    
   
   
 Dose:  600 mg Take 1 Tab by mouth every six (6) hours as needed. Indications: Pain Quantity:  30 Tab Refills:  0  
     
   
   
   
  
 metFORMIN 1,000 mg tablet Commonly known as:  GLUCOPHAGE Your last dose was: Your next dose is:    
   
   
 Dose:  1000 mg Take 1,000 mg by mouth two (2) times daily (with meals). Refills:  0 NovoLOG Mix 70-30 FlexPen 100 unit/mL (70-30) Inpn Generic drug:  insulin aspart protamine/insulin aspart Your last dose was: Your next dose is:    
   
   
 INYECTA 35 UNIDADES CON DESAYUNO,40 UNIDADES CON ALMUERZO,Y 35 UNIDADES CON LA TANI 3 VECES CADA GABRIELA Refills:  1 oxyCODONE IR 5 mg immediate release tablet Commonly known as:  Rich Wilkerson Your last dose was: Your next dose is:    
   
   
 Dose:  5 mg Take 1 Tab by mouth every six (6) hours as needed. Max Daily Amount: 20 mg.  
 Quantity:  30 Tab Refills:  0  
     
   
   
   
  
 simvastatin 20 mg tablet Commonly known as:  ZOCOR Your last dose was: Your next dose is:    
   
   
 Dose:  20 mg Take 20 mg by mouth nightly. Refills:  0  
     
   
   
   
  
 TOUJEO SOLOSTAR 300 unit/mL (1.5 mL) Inpn Generic drug:  insulin glargine Your last dose was: Your next dose is:    
   
   
 Dose:  72 Units 72 Units by SubCUTAneous route nightly. Refills:  0 Where to Get Your Medications Information on where to get these meds will be given to you by the nurse or doctor. ! Ask your nurse or doctor about these medications  
  amoxicillin-clavulanate 875-125 mg per tablet Discharge Instructions Call if you have a fever of 102 or more, heavy bleeding, severe pain, vomiting. Ok to shower. Keep your scheduled follow up appt. Discharge Instructions Attachments/References VASCULITIS: GENERAL INFO (ENGLISH) Discharge Orders None Introducing [facility]! [facility] introduces Citygoo patient portal. Now you can access parts of your medical record, email your doctor's office, and request medication refills online. 1. In your internet browser, go to [email or web address] 2. Click on the First Time User? Click Here link in the Sign In box. You will see the New Member Sign Up page. 3. Enter your Citygoo Access Code exactly as it appears below. You will not need to use this code after youve completed the sign-up process. If you do not sign up before the expiration date, you must request a new code. · Citygoo Access Code: [email or web address] Expires: 1/1/2018  1:31 PM 
 
 4. Enter the last four digits of your Social Security Number (xxxx) and Date of Birth (mm/dd/yyyy) as indicated and click Submit. You will be taken to the next sign-up page. 5. Create a Wibiya ID. This will be your Wibiya login ID and cannot be changed, so think of one that is secure and easy to remember. 6. Create a Wibiya password. You can change your password at any time. 7. Enter your Password Reset Question and Answer. This can be used at a later time if you forget your password. 8. Enter your e-mail address. You will receive e-mail notification when new information is available in [address] 9. Click Sign Up. You can now view and download portions of your medical record. 10. Click the Download Summary menu link to download a portable copy of your medical information. If you have questions, please visit the Frequently Asked Questions section of the Wibiya website. Remember, Wibiya is NOT to be used for urgent needs. For medical emergencies, dial 911. Now available from your iPhone and Android! General Information Please provide this summary of care documentation to your next provider. Patient Signature:  ____________________________________________________________ Date:  ____________________________________________________________  
  
Donnie Martinez Provider Signature:  ____________________________________________________________ Date:  ____________________________________________________________ More Information Learning About Vasculitis What is vasculitis? Vasculitis means inflamed blood vessels. This happens when the body's own immune system attacks the blood vessels. Your immune system might be reacting to an infection or a medicine. Or you may have an immune disorder. Vasculitis causes blood vessel walls to thicken, weaken, or stretch. This makes it harder for blood to flow.  And that can lead to symptoms in any parts of your body that aren't getting enough blood. There are different types of vasculitis. And different parts of the body can be affected. This includes the head, joints and muscles, the skin, and some internal organs. What can you expect when you have vasculitis? Vasculitis can cause a wide variety of symptoms. Which ones you have will depend on what blood vessels are involved and how serious the problem is. Some common symptoms are: · Fever. · Losing weight and not feeling hungry. · General tiredness. · General aches and pains. · Pain and swelling in an arm, a leg, or some other body part where the blood vessels are inflamed. For some people, the problem is short-term. For others it is long-term, or chronic. This problem may also go away, only to come back again later. How is it treated? The main goal of treatment is to reduce inflammation in the blood vessels. Mild cases may go away on their own. Sometimes over-the-counter pain medicine helps. For more severe cases, a doctor might prescribe stronger medicine, such as a corticosteroid. Follow-up care is a key part of your treatment and safety. Be sure to make and go to all appointments, and call your doctor if you are having problems. It's also a good idea to know your test results and keep a list of the medicines you take. Where can you learn more? Go to http://tyron-araceli.info/. Enter [id number] in the search box to learn more about \"Learning About Vasculitis. \" Current as of: March 20, 2017 Content Version: 11.3 © 5612-9565 [employer]. Care instructions adapted under license by [employer] (which disclaims liability or warranty for this information). If you have questions about a medical condition or this instruction, always ask your healthcare professional. [address] any warranty or liability for your use of this information.

## 2017-10-14 LAB
ATRIAL RATE: 115 BPM
CALCULATED P AXIS, ECG09: 44 DEGREES
CALCULATED R AXIS, ECG10: 30 DEGREES
CALCULATED T AXIS, ECG11: 22 DEGREES
DIAGNOSIS, 93000: NORMAL
GLUCOSE BLD STRIP.AUTO-MCNC: 172 MG/DL (ref 65–100)
GLUCOSE BLD STRIP.AUTO-MCNC: 176 MG/DL (ref 65–100)
GLUCOSE BLD STRIP.AUTO-MCNC: 191 MG/DL (ref 65–100)
GLUCOSE BLD STRIP.AUTO-MCNC: 258 MG/DL (ref 65–100)
GLUCOSE BLD STRIP.AUTO-MCNC: 280 MG/DL (ref 65–100)
P-R INTERVAL, ECG05: 138 MS
Q-T INTERVAL, ECG07: 332 MS
QRS DURATION, ECG06: 90 MS
QTC CALCULATION (BEZET), ECG08: 459 MS
SERVICE CMNT-IMP: ABNORMAL
VENTRICULAR RATE, ECG03: 115 BPM

## 2017-10-14 PROCEDURE — 65270000029 HC RM PRIVATE

## 2017-10-14 PROCEDURE — 74011250637 HC RX REV CODE- 250/637: Performed by: SPECIALIST

## 2017-10-14 PROCEDURE — 74011250636 HC RX REV CODE- 250/636: Performed by: SPECIALIST

## 2017-10-14 PROCEDURE — 74011000258 HC RX REV CODE- 258: Performed by: SPECIALIST

## 2017-10-14 PROCEDURE — 74011636637 HC RX REV CODE- 636/637: Performed by: SPECIALIST

## 2017-10-14 PROCEDURE — 82962 GLUCOSE BLOOD TEST: CPT

## 2017-10-14 RX ORDER — INSULIN GLARGINE 100 [IU]/ML
60 INJECTION, SOLUTION SUBCUTANEOUS
Status: DISCONTINUED | OUTPATIENT
Start: 2017-10-14 | End: 2017-10-18 | Stop reason: HOSPADM

## 2017-10-14 RX ORDER — FACIAL-BODY WIPES
10 EACH TOPICAL DAILY PRN
Status: DISCONTINUED | OUTPATIENT
Start: 2017-10-14 | End: 2017-10-18 | Stop reason: HOSPADM

## 2017-10-14 RX ORDER — ENOXAPARIN SODIUM 100 MG/ML
40 INJECTION SUBCUTANEOUS EVERY 24 HOURS
Status: DISCONTINUED | OUTPATIENT
Start: 2017-10-14 | End: 2017-10-18 | Stop reason: HOSPADM

## 2017-10-14 RX ORDER — INSULIN LISPRO 100 [IU]/ML
INJECTION, SOLUTION INTRAVENOUS; SUBCUTANEOUS
Status: DISCONTINUED | OUTPATIENT
Start: 2017-10-14 | End: 2017-10-18 | Stop reason: HOSPADM

## 2017-10-14 RX ORDER — DEXTROSE 50 % IN WATER (D50W) INTRAVENOUS SYRINGE
12.5-25 AS NEEDED
Status: DISCONTINUED | OUTPATIENT
Start: 2017-10-14 | End: 2017-10-18 | Stop reason: HOSPADM

## 2017-10-14 RX ORDER — IBUPROFEN 600 MG/1
600 TABLET ORAL
Status: DISCONTINUED | OUTPATIENT
Start: 2017-10-14 | End: 2017-10-18 | Stop reason: HOSPADM

## 2017-10-14 RX ORDER — SORBITOL SOLUTION 70 %
30 SOLUTION, ORAL MISCELLANEOUS DAILY PRN
Status: DISCONTINUED | OUTPATIENT
Start: 2017-10-14 | End: 2017-10-18 | Stop reason: HOSPADM

## 2017-10-14 RX ORDER — MAGNESIUM SULFATE 100 %
4 CRYSTALS MISCELLANEOUS AS NEEDED
Status: DISCONTINUED | OUTPATIENT
Start: 2017-10-14 | End: 2017-10-18 | Stop reason: HOSPADM

## 2017-10-14 RX ORDER — DOCUSATE SODIUM 100 MG/1
100 CAPSULE, LIQUID FILLED ORAL 2 TIMES DAILY
Status: DISCONTINUED | OUTPATIENT
Start: 2017-10-14 | End: 2017-10-18 | Stop reason: HOSPADM

## 2017-10-14 RX ADMIN — IBUPROFEN 600 MG: 600 TABLET, FILM COATED ORAL at 20:38

## 2017-10-14 RX ADMIN — PIPERACILLIN SODIUM,TAZOBACTAM SODIUM 3.38 G: 3; .375 INJECTION, POWDER, FOR SOLUTION INTRAVENOUS at 05:02

## 2017-10-14 RX ADMIN — OXYCODONE HYDROCHLORIDE 5 MG: 5 TABLET ORAL at 20:38

## 2017-10-14 RX ADMIN — INSULIN LISPRO 9 UNITS: 100 INJECTION, SOLUTION INTRAVENOUS; SUBCUTANEOUS at 13:26

## 2017-10-14 RX ADMIN — DOCUSATE SODIUM 100 MG: 100 CAPSULE, LIQUID FILLED ORAL at 09:23

## 2017-10-14 RX ADMIN — INSULIN GLARGINE 60 UNITS: 100 INJECTION, SOLUTION SUBCUTANEOUS at 01:54

## 2017-10-14 RX ADMIN — INSULIN LISPRO 9 UNITS: 100 INJECTION, SOLUTION INTRAVENOUS; SUBCUTANEOUS at 18:18

## 2017-10-14 RX ADMIN — OXYCODONE HYDROCHLORIDE 5 MG: 5 TABLET ORAL at 07:01

## 2017-10-14 RX ADMIN — PIPERACILLIN SODIUM,TAZOBACTAM SODIUM 3.38 G: 3; .375 INJECTION, POWDER, FOR SOLUTION INTRAVENOUS at 13:58

## 2017-10-14 RX ADMIN — INSULIN LISPRO 5 UNITS: 100 INJECTION, SOLUTION INTRAVENOUS; SUBCUTANEOUS at 13:25

## 2017-10-14 RX ADMIN — ENOXAPARIN SODIUM 40 MG: 40 INJECTION SUBCUTANEOUS at 09:22

## 2017-10-14 RX ADMIN — SODIUM CHLORIDE, SODIUM LACTATE, POTASSIUM CHLORIDE, AND CALCIUM CHLORIDE 150 ML/HR: 600; 310; 30; 20 INJECTION, SOLUTION INTRAVENOUS at 13:25

## 2017-10-14 RX ADMIN — INSULIN LISPRO 9 UNITS: 100 INJECTION, SOLUTION INTRAVENOUS; SUBCUTANEOUS at 07:30

## 2017-10-14 RX ADMIN — INSULIN LISPRO 2 UNITS: 100 INJECTION, SOLUTION INTRAVENOUS; SUBCUTANEOUS at 18:19

## 2017-10-14 RX ADMIN — ENALAPRIL MALEATE 10 MG: 5 TABLET ORAL at 09:23

## 2017-10-14 RX ADMIN — DOCUSATE SODIUM 100 MG: 100 CAPSULE, LIQUID FILLED ORAL at 18:00

## 2017-10-14 RX ADMIN — INSULIN LISPRO 2 UNITS: 100 INJECTION, SOLUTION INTRAVENOUS; SUBCUTANEOUS at 07:30

## 2017-10-14 RX ADMIN — INSULIN GLARGINE 60 UNITS: 100 INJECTION, SOLUTION SUBCUTANEOUS at 22:00

## 2017-10-14 RX ADMIN — ATORVASTATIN CALCIUM 20 MG: 10 TABLET, FILM COATED ORAL at 21:49

## 2017-10-14 RX ADMIN — OXYCODONE HYDROCHLORIDE 5 MG: 5 TABLET ORAL at 13:47

## 2017-10-14 RX ADMIN — PIPERACILLIN SODIUM,TAZOBACTAM SODIUM 3.38 G: 3; .375 INJECTION, POWDER, FOR SOLUTION INTRAVENOUS at 20:39

## 2017-10-14 RX ADMIN — SODIUM CHLORIDE, SODIUM LACTATE, POTASSIUM CHLORIDE, AND CALCIUM CHLORIDE 150 ML/HR: 600; 310; 30; 20 INJECTION, SOLUTION INTRAVENOUS at 21:49

## 2017-10-14 RX ADMIN — SODIUM CHLORIDE, SODIUM LACTATE, POTASSIUM CHLORIDE, AND CALCIUM CHLORIDE 150 ML/HR: 600; 310; 30; 20 INJECTION, SOLUTION INTRAVENOUS at 01:24

## 2017-10-14 RX ADMIN — IBUPROFEN 600 MG: 600 TABLET, FILM COATED ORAL at 16:47

## 2017-10-14 NOTE — PROGRESS NOTES
Dr. Jean People made aware of patient's 102 fever. Action per dr. Jean People is to give ibuprofen as needed. Continue to monitor.

## 2017-10-14 NOTE — H&P
Gynecology History and Physical    Name: Danielle Lei MRN: [id number] SSN: [id number]    YOB: 1970  Age: 56 y.o. Sex: female       Subjective:      Chief complaint:  fevers    Pamela Ramos is a 56 y.o.  female with a history of POD#3, s/p davinci LSH complicated by severe adhesions to rectosigmoid. Previous workup included CT scan last night showed pelvic collectio/hematoma. She presented to the ER with 2 days of fevers and pelvic pain. No vomiting. No BM since surgery. The current method of family planning is status post hysterectomy. OB History     No data available        Past Medical History:   Diagnosis Date    Diabetes ([address]     Hypertension     Nausea & vomiting      Past Surgical History:   Procedure Laterality Date    HX GYN  2011    REMOVAL OF FIBROIDS    HX ORTHOPAEDIC      CARPEL TUNNEL R HAND    HX ORTHOPAEDIC      L BEHIND THE  KNEE SURGERY      Social History     Occupational History    Not on file. Social History Main Topics    Smoking status: Never Smoker    Smokeless tobacco: Never Used    Alcohol use No    Drug use: No    Sexual activity: Not on file     Family History   Problem Relation Age of Onset    Diabetes Mother     No Known Problems Sister     No Known Problems Brother     No Known Problems Sister     No Known Problems Sister     No Known Problems Sister     No Known Problems Brother     No Known Problems Brother     No Known Problems Brother     No Known Problems Brother     Diabetes Brother         Allergies   Allergen Reactions    Tylenol [Acetaminophen] Itching     Prior to Admission medications    Medication Sig Start Date End Date Taking? Authorizing Provider   ibuprofen (MOTRIN) 600 mg tablet Take 1 Tab by mouth every six (6) hours as needed. Indications: Pain 10/11/17   Kathie Centeno MD   oxyCODONE IR (ROXICODONE) 5 mg immediate release tablet Take 1 Tab by mouth every six (6) hours as needed. Max Daily Amount: 20 mg. 10/11/17   Manoj Dejesus MD   cholecalciferol (VITAMIN D3) 5,000 unit capsule TAKE 1 CAPSULE BY MOUTH ONCE DAILY 8/16/17   Historical Provider   ferrous sulfate 324 mg (65 mg iron) tablet TAKE 1 TABLET ONCE A DAY ORALLY 30 DAY(S) 7/22/17   Historical Provider   enalapril (VASOTEC) 10 mg tablet TAKE 1 TABLET BY MOUTH EVERY DAY ONCE A DAY ORALLY 90 DAYS 8/10/17   Historical Provider   NOVOLOG MIX 70-30 FLEXPEN 100 unit/mL (70-30) inpn INYECTA 35 UNIDADES CON DESAYUNO,40 UNIDADES CON ALMUERZO,Y 35 UNIDADES CON LA TANI 3 VECES CADA GABRIELA 8/4/17   Historical Provider   insulin glargine (TOUJEO SOLOSTAR) 300 unit/mL (1.5 mL) inpn 72 Units by SubCUTAneous route nightly. Historical Provider   aspirin 81 mg chewable tablet Take 81 mg by mouth every morning. Historical Provider   simvastatin (ZOCOR) 20 mg tablet Take 20 mg by mouth nightly. Historical Provider   metFORMIN (GLUCOPHAGE) 1,000 mg tablet Take 1,000 mg by mouth two (2) times daily (with meals). Historical Provider        Review of Systems:  A comprehensive review of systems was negative except for that written in the History of Present Illness. Objective:     Vitals:    10/13/17 2223 10/13/17 2230 10/13/17 2324 10/14/17 0513   BP:  120/55 116/49 120/61   Pulse: (!) 111 (!) 111 (!) 105 99   Resp: 24 26 19 17   Temp:   98.5 °F (36.9 °C) 98.2 °F (36.8 °C)   SpO2: 94% 95% 96% 98%   Weight:       Height:           Physical Exam:  Ab soft, mild dist, no rebound or guarding  Ext NT  Pelvic def    Assessment:     Active Problems:    Pelvic hematoma in female (10/13/2017)       Admit for IV abx with may need IR to drain collection if pain and fevers do not resolve    Plan:        Discussed the risks of surgery including the risks of bleeding, infection, deep vein thrombosis, and surgical injuries to internal organs including but not limited to the bowels, bladder, rectum, and female reproductive organs.  The patient understands the risks; any and all questions were answered to the patient's satisfaction.     Signed By:  Ynse Delcid MD     October 14, 2017

## 2017-10-14 NOTE — PROGRESS NOTES
Pharmacy Automatic Renal Dosing Protocol - Antimicrobials    Indication for Antimicrobials: OB GYN infxn     Current Regimen of Each Antimicrobial:  Zosyn (Start Date 10/13; Day # 1)    Significant Cultures:   Blood cx pending    Paralysis, amputations, malnutrition: NA    Labs:  Recent Labs      10/13/17   1818  10/11/17   0450   CREA  0.51*   --    BUN  8   --    WBC  15.1*  8.6     Temp (24hrs), Av.3 °F (37.4 °C), Min:99.3 °F (37.4 °C), Max:99.3 °F (37.4 °C)      Creatinine Clearance (mL/min) or Dialysis: 100+  No results found for: PCT    Impression/Plan: Start zosyn 3.375g IV q8h     Pharmacy will follow daily and adjust medications as appropriate for renal function and/or serum levels.     Thank you,  [facility] [place], [address] [facility]

## 2017-10-14 NOTE — PROGRESS NOTES
General Surgery End of Shift Nursing Note    Bedside shift change report given to trice (oncoming nurse) by Rupali Lopez (offgoing nurse). Report included the following information SBAR. Shift worked:   [id number]   Significant changes during shift:  Developed fever this afternoon Dr. Chao Heart made aware. Ibuprofen ordered as needed continue to monitor.      Non-emergent issues for physician to address:   Elevated blood glucose and fever     Sherry Dos Santos RN

## 2017-10-14 NOTE — ED NOTES
Pt c/o pain in lower quadrants, pt feeling \"hot\" and nauseous. Pt medicated, air turned down with good effect.

## 2017-10-15 LAB
ANION GAP SERPL CALC-SCNC: 8 MMOL/L (ref 5–15)
BUN SERPL-MCNC: 10 MG/DL (ref 6–20)
BUN/CREAT SERPL: 22 (ref 12–20)
CALCIUM SERPL-MCNC: 8.2 MG/DL (ref 8.5–10.1)
CHLORIDE SERPL-SCNC: 104 MMOL/L (ref 97–108)
CO2 SERPL-SCNC: 27 MMOL/L (ref 21–32)
CREAT SERPL-MCNC: 0.45 MG/DL (ref 0.55–1.02)
ERYTHROCYTE [DISTWIDTH] IN BLOOD BY AUTOMATED COUNT: 15.6 % (ref 11.5–14.5)
GLUCOSE BLD STRIP.AUTO-MCNC: 188 MG/DL (ref 65–100)
GLUCOSE BLD STRIP.AUTO-MCNC: 195 MG/DL (ref 65–100)
GLUCOSE BLD STRIP.AUTO-MCNC: 196 MG/DL (ref 65–100)
GLUCOSE BLD STRIP.AUTO-MCNC: 84 MG/DL (ref 65–100)
GLUCOSE SERPL-MCNC: 146 MG/DL (ref 65–100)
HCT VFR BLD AUTO: 20.2 % (ref 35–47)
HGB BLD-MCNC: 6.6 G/DL (ref 11.5–16)
MCH RBC QN AUTO: 26.8 PG (ref 26–34)
MCHC RBC AUTO-ENTMCNC: 32.7 G/DL (ref 30–36.5)
MCV RBC AUTO: 82.1 FL (ref 80–99)
PLATELET # BLD AUTO: 345 K/UL (ref 150–400)
POTASSIUM SERPL-SCNC: 3.4 MMOL/L (ref 3.5–5.1)
RBC # BLD AUTO: 2.46 M/UL (ref 3.8–5.2)
SERVICE CMNT-IMP: ABNORMAL
SERVICE CMNT-IMP: NORMAL
SODIUM SERPL-SCNC: 139 MMOL/L (ref 136–145)
WBC # BLD AUTO: 10 K/UL (ref 3.6–11)

## 2017-10-15 PROCEDURE — 85027 COMPLETE CBC AUTOMATED: CPT | Performed by: SPECIALIST

## 2017-10-15 PROCEDURE — 36415 COLL VENOUS BLD VENIPUNCTURE: CPT | Performed by: SPECIALIST

## 2017-10-15 PROCEDURE — 74011250636 HC RX REV CODE- 250/636: Performed by: SPECIALIST

## 2017-10-15 PROCEDURE — 65270000029 HC RM PRIVATE

## 2017-10-15 PROCEDURE — 74011000258 HC RX REV CODE- 258: Performed by: SPECIALIST

## 2017-10-15 PROCEDURE — 80048 BASIC METABOLIC PNL TOTAL CA: CPT | Performed by: SPECIALIST

## 2017-10-15 PROCEDURE — 74011636637 HC RX REV CODE- 636/637: Performed by: SPECIALIST

## 2017-10-15 PROCEDURE — 74011250637 HC RX REV CODE- 250/637: Performed by: SPECIALIST

## 2017-10-15 PROCEDURE — 82962 GLUCOSE BLOOD TEST: CPT

## 2017-10-15 RX ORDER — LANOLIN ALCOHOL/MO/W.PET/CERES
1 CREAM (GRAM) TOPICAL 2 TIMES DAILY WITH MEALS
Status: DISCONTINUED | OUTPATIENT
Start: 2017-10-15 | End: 2017-10-18 | Stop reason: HOSPADM

## 2017-10-15 RX ADMIN — HYDROMORPHONE HYDROCHLORIDE 1 MG: 1 INJECTION, SOLUTION INTRAMUSCULAR; INTRAVENOUS; SUBCUTANEOUS at 10:11

## 2017-10-15 RX ADMIN — HYDROMORPHONE HYDROCHLORIDE 1 MG: 1 INJECTION, SOLUTION INTRAMUSCULAR; INTRAVENOUS; SUBCUTANEOUS at 15:49

## 2017-10-15 RX ADMIN — INSULIN LISPRO 2 UNITS: 100 INJECTION, SOLUTION INTRAVENOUS; SUBCUTANEOUS at 09:01

## 2017-10-15 RX ADMIN — INSULIN GLARGINE 60 UNITS: 100 INJECTION, SOLUTION SUBCUTANEOUS at 23:23

## 2017-10-15 RX ADMIN — PIPERACILLIN SODIUM,TAZOBACTAM SODIUM 3.38 G: 3; .375 INJECTION, POWDER, FOR SOLUTION INTRAVENOUS at 12:21

## 2017-10-15 RX ADMIN — INSULIN LISPRO 2 UNITS: 100 INJECTION, SOLUTION INTRAVENOUS; SUBCUTANEOUS at 12:21

## 2017-10-15 RX ADMIN — INSULIN LISPRO 9 UNITS: 100 INJECTION, SOLUTION INTRAVENOUS; SUBCUTANEOUS at 09:02

## 2017-10-15 RX ADMIN — HYDROMORPHONE HYDROCHLORIDE 1 MG: 1 INJECTION, SOLUTION INTRAMUSCULAR; INTRAVENOUS; SUBCUTANEOUS at 20:00

## 2017-10-15 RX ADMIN — ENOXAPARIN SODIUM 40 MG: 40 INJECTION SUBCUTANEOUS at 06:37

## 2017-10-15 RX ADMIN — Medication 10 ML: at 14:25

## 2017-10-15 RX ADMIN — DOCUSATE SODIUM 100 MG: 100 CAPSULE, LIQUID FILLED ORAL at 16:53

## 2017-10-15 RX ADMIN — FERROUS SULFATE TAB 325 MG (65 MG ELEMENTAL FE) 325 MG: 325 (65 FE) TAB at 16:53

## 2017-10-15 RX ADMIN — INSULIN LISPRO 2 UNITS: 100 INJECTION, SOLUTION INTRAVENOUS; SUBCUTANEOUS at 16:54

## 2017-10-15 RX ADMIN — IBUPROFEN 600 MG: 600 TABLET, FILM COATED ORAL at 14:25

## 2017-10-15 RX ADMIN — ATORVASTATIN CALCIUM 20 MG: 10 TABLET, FILM COATED ORAL at 22:05

## 2017-10-15 RX ADMIN — INSULIN LISPRO 9 UNITS: 100 INJECTION, SOLUTION INTRAVENOUS; SUBCUTANEOUS at 16:53

## 2017-10-15 RX ADMIN — ENALAPRIL MALEATE 10 MG: 5 TABLET ORAL at 09:02

## 2017-10-15 RX ADMIN — PIPERACILLIN SODIUM,TAZOBACTAM SODIUM 3.38 G: 3; .375 INJECTION, POWDER, FOR SOLUTION INTRAVENOUS at 04:16

## 2017-10-15 RX ADMIN — INSULIN LISPRO 9 UNITS: 100 INJECTION, SOLUTION INTRAVENOUS; SUBCUTANEOUS at 12:22

## 2017-10-15 RX ADMIN — Medication 10 ML: at 22:06

## 2017-10-15 RX ADMIN — PIPERACILLIN SODIUM,TAZOBACTAM SODIUM 3.38 G: 3; .375 INJECTION, POWDER, FOR SOLUTION INTRAVENOUS at 20:01

## 2017-10-15 RX ADMIN — DOCUSATE SODIUM 100 MG: 100 CAPSULE, LIQUID FILLED ORAL at 09:02

## 2017-10-15 NOTE — PROGRESS NOTES
Problem: Falls - Risk of  Goal: *Absence of Falls  Document Svetlana Fall Risk and appropriate interventions in the flowsheet.    Outcome: Progressing Towards Goal  Fall Risk Interventions:              Medication Interventions: Patient to call before getting OOB     Elimination Interventions: Patient to call for help with toileting needs

## 2017-10-15 NOTE — PROGRESS NOTES
Problem: Falls - Risk of  Goal: *Absence of Falls  Document Svetlana Fall Risk and appropriate interventions in the flowsheet.    Outcome: Progressing Towards Goal  Fall Risk Interventions:              Medication Interventions: Patient to call before getting OOB     Elimination Interventions: Call light in reach

## 2017-10-15 NOTE — PROGRESS NOTES
Gynecology Progress Note    Patient doing well post-op day 5 from  without significant complaints. Pain controlled on current medication. Voiding without difficulty. Patient is passing flatus. bowel movement    Vitals:  Blood pressure 114/62, pulse 91, temperature 98.4 °F (36.9 °C), resp. rate 18, height 5' 2\" (1.575 m), weight 84 kg (185 lb 3 oz), last menstrual period 2017, SpO2 98 %. Temp (24hrs), Av.2 °F (37.3 °C), Min:98.1 °F (36.7 °C), Max:102.3 °F (39.1 °C)        Exam:  Patient without distress. Abdomen soft,  nontender. Incision dry and clean without erythema. Lower extremities are negative for swelling, cords, or tenderness. Lab/Data Review: All lab results for the last 24 hours reviewed. Assessment and Plan:  Patient appears to be having uncomplicated post  course. Continue routine post-op care.

## 2017-10-15 NOTE — PROGRESS NOTES
General Surgery End of Shift Nursing Note    Bedside shift change report given to DAHLIA riggs (oncoming nurse) by Pelon Kerr RN (offgoing nurse). Report included the following information SBAR, Kardex, Intake/Output, MAR and Recent Results. Shift worked:   7a-7p   Significant changes during shift:       Non-emergent issues for physician to address:        Number times ambulated in hallway past shift: 0. Up in room. Number of times OOB to chair past shift: 1    Pain Management:  Current medication: Dilaudid  Patient states pain is manageable on current pain medication: YES    GI:    Current diet:  DIET REGULAR    Tolerating current diet: YES  Passing flatus: YES  Last Bowel Movement: today   Appearance:     Respiratory:    Incentive Spirometer at bedside: YES  Patient instructed on use: YES    Patient Safety:    Falls Score: 1  Bed Alarm On? No  Sitter?  No    Padma Aguirre

## 2017-10-16 LAB
ALBUMIN SERPL-MCNC: 2.3 G/DL (ref 3.5–5)
ALBUMIN/GLOB SERPL: 0.6 {RATIO} (ref 1.1–2.2)
ALP SERPL-CCNC: 102 U/L (ref 45–117)
ALT SERPL-CCNC: 33 U/L (ref 12–78)
ANION GAP SERPL CALC-SCNC: 8 MMOL/L (ref 5–15)
AST SERPL-CCNC: 22 U/L (ref 15–37)
BILIRUB SERPL-MCNC: 1 MG/DL (ref 0.2–1)
BUN SERPL-MCNC: 7 MG/DL (ref 6–20)
BUN/CREAT SERPL: 19 (ref 12–20)
CALCIUM SERPL-MCNC: 7.9 MG/DL (ref 8.5–10.1)
CHLORIDE SERPL-SCNC: 104 MMOL/L (ref 97–108)
CO2 SERPL-SCNC: 27 MMOL/L (ref 21–32)
CREAT SERPL-MCNC: 0.36 MG/DL (ref 0.55–1.02)
ERYTHROCYTE [DISTWIDTH] IN BLOOD BY AUTOMATED COUNT: 15.7 % (ref 11.5–14.5)
GLOBULIN SER CALC-MCNC: 3.9 G/DL (ref 2–4)
GLUCOSE BLD STRIP.AUTO-MCNC: 117 MG/DL (ref 65–100)
GLUCOSE BLD STRIP.AUTO-MCNC: 173 MG/DL (ref 65–100)
GLUCOSE BLD STRIP.AUTO-MCNC: 232 MG/DL (ref 65–100)
GLUCOSE BLD STRIP.AUTO-MCNC: 240 MG/DL (ref 65–100)
GLUCOSE SERPL-MCNC: 128 MG/DL (ref 65–100)
HCT VFR BLD AUTO: 20.3 % (ref 35–47)
HGB BLD-MCNC: 6.4 G/DL (ref 11.5–16)
MCH RBC QN AUTO: 26 PG (ref 26–34)
MCHC RBC AUTO-ENTMCNC: 31.5 G/DL (ref 30–36.5)
MCV RBC AUTO: 82.5 FL (ref 80–99)
NRBC # BLD: 0.08 K/UL (ref 0–0.01)
NRBC BLD-RTO: 0.7 PER 100 WBC
PLATELET # BLD AUTO: 400 K/UL (ref 150–400)
POTASSIUM SERPL-SCNC: 3.7 MMOL/L (ref 3.5–5.1)
PROT SERPL-MCNC: 6.2 G/DL (ref 6.4–8.2)
RBC # BLD AUTO: 2.46 M/UL (ref 3.8–5.2)
SERVICE CMNT-IMP: ABNORMAL
SODIUM SERPL-SCNC: 139 MMOL/L (ref 136–145)
WBC # BLD AUTO: 12 K/UL (ref 3.6–11)
WBC NRBC COR # BLD: ABNORMAL 10*3/UL

## 2017-10-16 PROCEDURE — 74011250636 HC RX REV CODE- 250/636: Performed by: SPECIALIST

## 2017-10-16 PROCEDURE — 65270000029 HC RM PRIVATE

## 2017-10-16 PROCEDURE — 85027 COMPLETE CBC AUTOMATED: CPT | Performed by: SPECIALIST

## 2017-10-16 PROCEDURE — 74011250637 HC RX REV CODE- 250/637: Performed by: SPECIALIST

## 2017-10-16 PROCEDURE — 74011636637 HC RX REV CODE- 636/637: Performed by: SPECIALIST

## 2017-10-16 PROCEDURE — 36415 COLL VENOUS BLD VENIPUNCTURE: CPT | Performed by: SPECIALIST

## 2017-10-16 PROCEDURE — 82962 GLUCOSE BLOOD TEST: CPT

## 2017-10-16 PROCEDURE — 74011000258 HC RX REV CODE- 258: Performed by: SPECIALIST

## 2017-10-16 PROCEDURE — 80053 COMPREHEN METABOLIC PANEL: CPT | Performed by: SPECIALIST

## 2017-10-16 RX ADMIN — Medication 10 ML: at 10:17

## 2017-10-16 RX ADMIN — ENOXAPARIN SODIUM 40 MG: 40 INJECTION SUBCUTANEOUS at 10:12

## 2017-10-16 RX ADMIN — SODIUM CHLORIDE, SODIUM LACTATE, POTASSIUM CHLORIDE, AND CALCIUM CHLORIDE 150 ML/HR: 600; 310; 30; 20 INJECTION, SOLUTION INTRAVENOUS at 12:55

## 2017-10-16 RX ADMIN — PIPERACILLIN SODIUM,TAZOBACTAM SODIUM 3.38 G: 3; .375 INJECTION, POWDER, FOR SOLUTION INTRAVENOUS at 21:11

## 2017-10-16 RX ADMIN — Medication 10 ML: at 05:26

## 2017-10-16 RX ADMIN — IBUPROFEN 600 MG: 600 TABLET, FILM COATED ORAL at 03:47

## 2017-10-16 RX ADMIN — INSULIN LISPRO 9 UNITS: 100 INJECTION, SOLUTION INTRAVENOUS; SUBCUTANEOUS at 17:37

## 2017-10-16 RX ADMIN — ATORVASTATIN CALCIUM 20 MG: 10 TABLET, FILM COATED ORAL at 21:11

## 2017-10-16 RX ADMIN — INSULIN GLARGINE 60 UNITS: 100 INJECTION, SOLUTION SUBCUTANEOUS at 22:02

## 2017-10-16 RX ADMIN — INSULIN LISPRO 3 UNITS: 100 INJECTION, SOLUTION INTRAVENOUS; SUBCUTANEOUS at 12:33

## 2017-10-16 RX ADMIN — METFORMIN HYDROCHLORIDE 1000 MG: 500 TABLET, FILM COATED ORAL at 17:25

## 2017-10-16 RX ADMIN — OXYCODONE HYDROCHLORIDE 5 MG: 5 TABLET ORAL at 12:29

## 2017-10-16 RX ADMIN — PIPERACILLIN SODIUM,TAZOBACTAM SODIUM 3.38 G: 3; .375 INJECTION, POWDER, FOR SOLUTION INTRAVENOUS at 03:47

## 2017-10-16 RX ADMIN — PIPERACILLIN SODIUM,TAZOBACTAM SODIUM 3.38 G: 3; .375 INJECTION, POWDER, FOR SOLUTION INTRAVENOUS at 12:30

## 2017-10-16 RX ADMIN — METFORMIN HYDROCHLORIDE 1000 MG: 500 TABLET, FILM COATED ORAL at 10:10

## 2017-10-16 RX ADMIN — FERROUS SULFATE TAB 325 MG (65 MG ELEMENTAL FE) 325 MG: 325 (65 FE) TAB at 17:25

## 2017-10-16 RX ADMIN — FERROUS SULFATE TAB 325 MG (65 MG ELEMENTAL FE) 325 MG: 325 (65 FE) TAB at 10:09

## 2017-10-16 RX ADMIN — INSULIN LISPRO 9 UNITS: 100 INJECTION, SOLUTION INTRAVENOUS; SUBCUTANEOUS at 12:34

## 2017-10-16 RX ADMIN — DOCUSATE SODIUM 100 MG: 100 CAPSULE, LIQUID FILLED ORAL at 10:09

## 2017-10-16 RX ADMIN — INSULIN LISPRO 9 UNITS: 100 INJECTION, SOLUTION INTRAVENOUS; SUBCUTANEOUS at 10:11

## 2017-10-16 RX ADMIN — ENALAPRIL MALEATE 10 MG: 5 TABLET ORAL at 10:14

## 2017-10-16 RX ADMIN — INSULIN LISPRO 2 UNITS: 100 INJECTION, SOLUTION INTRAVENOUS; SUBCUTANEOUS at 10:10

## 2017-10-16 RX ADMIN — SODIUM CHLORIDE, SODIUM LACTATE, POTASSIUM CHLORIDE, AND CALCIUM CHLORIDE 150 ML/HR: 600; 310; 30; 20 INJECTION, SOLUTION INTRAVENOUS at 00:21

## 2017-10-16 RX ADMIN — IBUPROFEN 600 MG: 600 TABLET, FILM COATED ORAL at 18:51

## 2017-10-16 RX ADMIN — DOCUSATE SODIUM 100 MG: 100 CAPSULE, LIQUID FILLED ORAL at 17:25

## 2017-10-16 RX ADMIN — OXYCODONE HYDROCHLORIDE 5 MG: 5 TABLET ORAL at 17:25

## 2017-10-16 NOTE — PROGRESS NOTES
Spiritual Care Assessment/Progress Notes    Sulma Marshall [id number]  [id number]    1970  56 y.o.  female    Patient Telephone Number: [phone] (home)   Restoration Affiliation: Alevism   Language:    Extended Emergency Contact Information  Primary Emergency Contact: Rudy Love  Address: [address] [place] [address] Phone: [phone]  Mobile Phone: [phone]  Relation: Spouse   Patient Active Problem List    Diagnosis Date Noted    Pelvic hematoma in female 10/13/2017    Fibroids 10/10/2017        Date: 10/16/2017       Level of Restoration/Spiritual Activity:  []         Involved in amy tradition/spiritual practice    []         Not involved in amy tradition/spiritual practice  [x]         Spiritually oriented    []         Claims no spiritual orientation    []         seeking spiritual identity  []         Feels alienated from Faith practice/tradition  []         Feels angry about Faith practice/tradition  [x]         Spirituality/Faith tradition is a resource for coping at this time.   []         Not able to assess due to medical condition    Services Provided Today:  []         crisis intervention    []         reading Scriptures  [x]         spiritual assessment    []         prayer  [x]         empathic listening/emotional support  []         rites and rituals (cite in comments)  []         life review     []         Faith support  []         theological development   []         advocacy  []         ethical dialog     []         blessing  []         bereavement support    [x]         support to family  []         anticipatory grief support   []         help with AMD  []         spiritual guidance    []         meditation      Spiritual Care Needs  []         Emotional Support  [x]         Spiritual/Restoration Care  []         Loss/Adjustment  []         Advocacy/Referral                /Ethics  []         No needs expressed at               this time  []         Other: (note in               comments)  Spiritual Care Plan  []         Follow up visits with               pt/family  []         Provide materials  []         Schedule sacraments  []         Contact Community               Clergy  [x]         Follow up as needed  []         Other: (note in               comments)     Briefly met with Vietnamese speaking pt and  as pt was walking in [place]. Led pt and  in processing. Pt reported to be making progress for which she credited God. Affirmed amy and pronounced continued blessings. Pt self-reported to be coping well. Advised of availability and support as needed. Couple expressed appreciation. ALVARO Andrade. Daisy

## 2017-10-16 NOTE — PROGRESS NOTES
General Surgery End of Shift Nursing Note    Bedside shift change report given to Shaila Infante (oncoming nurse) . Report included the following information SBAR, Kardex, Intake/Output, MAR and Recent Results. Shift worked:   [id number]   Significant changes during shift:    Afebrile all night, less pain   Non-emergent issues for physician to address:        Number times ambulated in hallway past shift: x1    Number of times OOB to chair past shift: up a d carlos    Pain Management:  Current medication: dilaudid   Patient states pain is manageable on current pain medication: YES    GI:    Current diet:  DIET REGULAR    Tolerating current diet: YES  Passing flatus: YES  Last Bowel Movement:    Appearance:     Respiratory:    Incentive Spirometer at bedside: YES  Patient instructed on use: YES    Patient Safety:    Falls Score: 1  Bed Alarm On? No  Sitter?  No    Eva Morales RN

## 2017-10-16 NOTE — PROGRESS NOTES
Gynecology Progress Note    Patient doing well post-op day 6 from  without significant complaints. Pain controlled on current medication. Voiding without difficulty. Patient is passing flatus. Vitals:  Blood pressure 128/62, pulse 93, temperature 99.2 °F (37.3 °C), resp. rate 18, height 5' 2\" (1.575 m), weight 84 kg (185 lb 3 oz), last menstrual period 2017, SpO2 96 %. Temp (24hrs), Av.6 °F (37.6 °C), Min:98.2 °F (36.8 °C), Max:102.4 °F (39.1 °C)        Exam:  Patient without distress. Abdomen soft,  nontender. Incision dry and clean without erythema. Lower extremities are negative for swelling, cords, or tenderness. Lab/Data Review: All lab results for the last 24 hours reviewed. Assessment and Plan:  Patient appears to be having uncomplicated post  Course.had fever yesterday, will keep until 24 hours afebrile,  Continue routine post-op care.

## 2017-10-16 NOTE — PROGRESS NOTES
Pt is a 56 y.o.  female, admitted for pelvic hematoma in female. Pt was alert and oriented, upon CM room visit with spouse by bedside. Pt reported that she resides in a one story home (2 steps into main entrance). Pt reported that her spouse gives her assistance with ADLs, and she does not drive. Pt reported that she is active with her PCP: Sept 2017. Pt reported that she uses [facility] ([address] Pt reported no DME, HH/SNF. Pt will have transportation home at d/c. CM will continue to follow up with pt and make referrals as deemed necessary. Care Management Interventions  PCP Verified by CM: Yes  Mode of Transport at Discharge:  Other (see comment)  Transition of Care Consult (CM Consult): Discharge Planning  Discharge Durable Medical Equipment: No  Physical Therapy Consult: No  Occupational Therapy Consult: No  Speech Therapy Consult: No  Current Support Network: Lives with Spouse, Own Home  Confirm Follow Up Transport: Family  Plan discussed with Pt/Family/Caregiver: Yes  Discharge Location  Discharge Placement: [address], MSW   [phone]

## 2017-10-17 LAB
ANION GAP SERPL CALC-SCNC: 6 MMOL/L (ref 5–15)
BUN SERPL-MCNC: 8 MG/DL (ref 6–20)
BUN/CREAT SERPL: 21 (ref 12–20)
CALCIUM SERPL-MCNC: 8.1 MG/DL (ref 8.5–10.1)
CHLORIDE SERPL-SCNC: 105 MMOL/L (ref 97–108)
CO2 SERPL-SCNC: 26 MMOL/L (ref 21–32)
CREAT SERPL-MCNC: 0.39 MG/DL (ref 0.55–1.02)
ERYTHROCYTE [DISTWIDTH] IN BLOOD BY AUTOMATED COUNT: 16.1 % (ref 11.5–14.5)
GLUCOSE BLD STRIP.AUTO-MCNC: 107 MG/DL (ref 65–100)
GLUCOSE BLD STRIP.AUTO-MCNC: 108 MG/DL (ref 65–100)
GLUCOSE BLD STRIP.AUTO-MCNC: 155 MG/DL (ref 65–100)
GLUCOSE BLD STRIP.AUTO-MCNC: 189 MG/DL (ref 65–100)
GLUCOSE SERPL-MCNC: 104 MG/DL (ref 65–100)
HCT VFR BLD AUTO: 19.8 % (ref 35–47)
HGB BLD-MCNC: 6.2 G/DL (ref 11.5–16)
MCH RBC QN AUTO: 25.9 PG (ref 26–34)
MCHC RBC AUTO-ENTMCNC: 31.3 G/DL (ref 30–36.5)
MCV RBC AUTO: 82.8 FL (ref 80–99)
PLATELET # BLD AUTO: 396 K/UL (ref 150–400)
POTASSIUM SERPL-SCNC: 3.4 MMOL/L (ref 3.5–5.1)
RBC # BLD AUTO: 2.39 M/UL (ref 3.8–5.2)
SERVICE CMNT-IMP: ABNORMAL
SODIUM SERPL-SCNC: 137 MMOL/L (ref 136–145)
WBC # BLD AUTO: 12.1 K/UL (ref 3.6–11)

## 2017-10-17 PROCEDURE — 82962 GLUCOSE BLOOD TEST: CPT

## 2017-10-17 PROCEDURE — 74011250636 HC RX REV CODE- 250/636: Performed by: SPECIALIST

## 2017-10-17 PROCEDURE — 30233N1 TRANSFUSION OF NONAUTOLOGOUS RED BLOOD CELLS INTO PERIPHERAL VEIN, PERCUTANEOUS APPROACH: ICD-10-PCS | Performed by: OBSTETRICS & GYNECOLOGY

## 2017-10-17 PROCEDURE — 74011000258 HC RX REV CODE- 258: Performed by: SPECIALIST

## 2017-10-17 PROCEDURE — 74011250637 HC RX REV CODE- 250/637: Performed by: SPECIALIST

## 2017-10-17 PROCEDURE — 36415 COLL VENOUS BLD VENIPUNCTURE: CPT | Performed by: SPECIALIST

## 2017-10-17 PROCEDURE — 80048 BASIC METABOLIC PNL TOTAL CA: CPT | Performed by: SPECIALIST

## 2017-10-17 PROCEDURE — 85027 COMPLETE CBC AUTOMATED: CPT | Performed by: SPECIALIST

## 2017-10-17 PROCEDURE — 86920 COMPATIBILITY TEST SPIN: CPT | Performed by: OBSTETRICS & GYNECOLOGY

## 2017-10-17 PROCEDURE — 74011636637 HC RX REV CODE- 636/637: Performed by: SPECIALIST

## 2017-10-17 PROCEDURE — 65270000029 HC RM PRIVATE

## 2017-10-17 PROCEDURE — P9016 RBC LEUKOCYTES REDUCED: HCPCS | Performed by: OBSTETRICS & GYNECOLOGY

## 2017-10-17 PROCEDURE — 86900 BLOOD TYPING SEROLOGIC ABO: CPT | Performed by: OBSTETRICS & GYNECOLOGY

## 2017-10-17 PROCEDURE — 36430 TRANSFUSION BLD/BLD COMPNT: CPT

## 2017-10-17 RX ORDER — SODIUM CHLORIDE 9 MG/ML
250 INJECTION, SOLUTION INTRAVENOUS AS NEEDED
Status: DISCONTINUED | OUTPATIENT
Start: 2017-10-17 | End: 2017-10-18 | Stop reason: HOSPADM

## 2017-10-17 RX ADMIN — METFORMIN HYDROCHLORIDE 1000 MG: 500 TABLET, FILM COATED ORAL at 16:02

## 2017-10-17 RX ADMIN — ATORVASTATIN CALCIUM 20 MG: 10 TABLET, FILM COATED ORAL at 23:04

## 2017-10-17 RX ADMIN — INSULIN LISPRO 2 UNITS: 100 INJECTION, SOLUTION INTRAVENOUS; SUBCUTANEOUS at 09:32

## 2017-10-17 RX ADMIN — OXYCODONE HYDROCHLORIDE 5 MG: 5 TABLET ORAL at 09:29

## 2017-10-17 RX ADMIN — INSULIN GLARGINE 60 UNITS: 100 INJECTION, SOLUTION SUBCUTANEOUS at 22:56

## 2017-10-17 RX ADMIN — FERROUS SULFATE TAB 325 MG (65 MG ELEMENTAL FE) 325 MG: 325 (65 FE) TAB at 09:29

## 2017-10-17 RX ADMIN — DOCUSATE SODIUM 100 MG: 100 CAPSULE, LIQUID FILLED ORAL at 09:31

## 2017-10-17 RX ADMIN — INSULIN LISPRO 9 UNITS: 100 INJECTION, SOLUTION INTRAVENOUS; SUBCUTANEOUS at 16:15

## 2017-10-17 RX ADMIN — PIPERACILLIN SODIUM,TAZOBACTAM SODIUM 3.38 G: 3; .375 INJECTION, POWDER, FOR SOLUTION INTRAVENOUS at 22:55

## 2017-10-17 RX ADMIN — Medication 10 ML: at 23:42

## 2017-10-17 RX ADMIN — INSULIN LISPRO 2 UNITS: 100 INJECTION, SOLUTION INTRAVENOUS; SUBCUTANEOUS at 12:51

## 2017-10-17 RX ADMIN — PIPERACILLIN SODIUM,TAZOBACTAM SODIUM 3.38 G: 3; .375 INJECTION, POWDER, FOR SOLUTION INTRAVENOUS at 12:52

## 2017-10-17 RX ADMIN — OXYCODONE HYDROCHLORIDE 5 MG: 5 TABLET ORAL at 04:00

## 2017-10-17 RX ADMIN — DOCUSATE SODIUM 100 MG: 100 CAPSULE, LIQUID FILLED ORAL at 19:16

## 2017-10-17 RX ADMIN — FERROUS SULFATE TAB 325 MG (65 MG ELEMENTAL FE) 325 MG: 325 (65 FE) TAB at 16:01

## 2017-10-17 RX ADMIN — INSULIN LISPRO 9 UNITS: 100 INJECTION, SOLUTION INTRAVENOUS; SUBCUTANEOUS at 09:31

## 2017-10-17 RX ADMIN — METFORMIN HYDROCHLORIDE 1000 MG: 500 TABLET, FILM COATED ORAL at 09:29

## 2017-10-17 RX ADMIN — PIPERACILLIN SODIUM,TAZOBACTAM SODIUM 3.38 G: 3; .375 INJECTION, POWDER, FOR SOLUTION INTRAVENOUS at 03:59

## 2017-10-17 RX ADMIN — Medication 10 ML: at 07:04

## 2017-10-17 RX ADMIN — SODIUM CHLORIDE, SODIUM LACTATE, POTASSIUM CHLORIDE, AND CALCIUM CHLORIDE 150 ML/HR: 600; 310; 30; 20 INJECTION, SOLUTION INTRAVENOUS at 04:06

## 2017-10-17 RX ADMIN — INSULIN LISPRO 9 UNITS: 100 INJECTION, SOLUTION INTRAVENOUS; SUBCUTANEOUS at 12:50

## 2017-10-17 RX ADMIN — ENOXAPARIN SODIUM 40 MG: 40 INJECTION SUBCUTANEOUS at 07:04

## 2017-10-17 RX ADMIN — IBUPROFEN 600 MG: 600 TABLET, FILM COATED ORAL at 16:01

## 2017-10-17 NOTE — PROGRESS NOTES
During Blood transfusion pt temp elevated to 100.6. Gave 600mg ibuprofen as ordered and continued to monitor.  Temp decreased to 99.8

## 2017-10-17 NOTE — PROGRESS NOTES
General Surgery End of Shift Nursing Note    Bedside shift change report given to [address] [place] [address] (oncoming nurse) by Bonita Adams (offgoing nurse). Report included the following information SBAR, Intake/Output, MAR, Accordion and Recent Results. Shift worked:   11p-7a   Significant changes during shift:    Hemoglobin 6.2  IV went bad   Non-emergent issues for physician to address: HBG 6.2     Number times ambulated in hallway past shift: 0    Number of times OOB to chair past shift: 0    Pain Management:  Current medication: percocet  Patient states pain is manageable on current pain medication: YES    GI:    Current diet:  DIET REGULAR    Tolerating current diet: YES  Passing flatus: YES  Last Bowel Movement: today   Appearance: formed    Respiratory:    Incentive Spirometer at bedside: YES  Patient instructed on use: YES    Patient Safety:    Falls Score: 1  Bed Alarm On? No  Sitter?  No    Kraig Major

## 2017-10-17 NOTE — ROUTINE PROCESS
General Surgery End of Shift Nursing Note    Bedside shift change report given to Liliana Vasquez and Denis Berkowitz  (oncoming nurse) by Doris Mckeon (offgoing nurse). Report included the following information SBAR, Kardex, Procedure Summary, Intake/Output, MAR and Recent Results. Shift worked:   1-230   Significant changes during shift:    Pt received 2 units of PRB. Temp 100.1   Non-emergent issues for physician to address:   temp     Number times ambulated in hallway past shift: 1    Number of times OOB to chair past shift: 2    Pain Management:  Current medication: Rubi Q4  Patient states pain is manageable on current pain medication: YES    GI:    Current diet:  DIET REGULAR    Tolerating current diet: YES  Passing flatus: YES  Last Bowel Movement: yesterday   Appearance: not observed    Respiratory:    Incentive Spirometer at bedside: YES  Patient instructed on use: YES    Patient Safety:    Falls Score: 1  Bed Alarm On? No  Sitter?  No    Alexandra Clay

## 2017-10-17 NOTE — PROGRESS NOTES
Gynecology Progress Note    [place]    She is without significant complaints. Pain controlled on current medication. Voiding without difficulty. Patient is passing flatus. She is is tolerating her diet. Vitals:  Temp (24hrs), Av.6 °F (37 °C), Min:97.4 °F (36.3 °C), Max:101.2 °F (38.4 °C)      Visit Vitals    /64 (BP 1 Location: Right arm, BP Patient Position: At rest)    Pulse 79    Temp 98.4 °F (36.9 °C)    Resp 18    Ht 5' 2\" (1.575 m)    Wt 84 kg (185 lb 3 oz)    SpO2 100%    BMI 33.87 kg/m2        Intake and Output:   Current shift:    Last 3 completed shifts: 10/15 1901 - 10/17 0700  In: 3012 [P.O.:600;  I.V.:5965]  Out: -       Exam:  General: alert, cooperative, no distress, appears stated age     Lung: clear to auscultation bilaterally     Heart: regular rate and rhythm, S1, S2 normal, no murmur, click, rub or gallop     Abdomen: incision c/d/i, bruizing aroung incisions        Extremities: wnl      Labs:   Lab Results   Component Value Date/Time    WBC 12.1 10/17/2017 04:04 AM    WBC 12.0 10/16/2017 03:40 AM    WBC 10.0 10/15/2017 04:55 AM    WBC 15.1 10/13/2017 06:18 PM    WBC 8.6 10/11/2017 04:50 AM    WBC 11.1 10/03/2017 02:40 PM    HGB 6.2 10/17/2017 04:04 AM    HGB 6.4 10/16/2017 03:40 AM    HGB 6.6 10/15/2017 04:55 AM    HGB 7.7 10/13/2017 06:18 PM    HGB 7.8 10/11/2017 04:50 AM    HGB 11.4 10/03/2017 02:40 PM    HCT 19.8 10/17/2017 04:04 AM    HCT 20.3 10/16/2017 03:40 AM    HCT 20.2 10/15/2017 04:55 AM    HCT 24.0 10/13/2017 06:18 PM    HCT 24.3 10/11/2017 04:50 AM    HCT 35.3 10/03/2017 02:40 PM    PLATELET 994  04:04 AM    PLATELET 822  03:40 AM    PLATELET 084  04:55 AM    PLATELET 443  06:18 PM    PLATELET 898  04:50 AM    PLATELET 093  02:40 PM       Recent Results (from the past 24 hour(s))   GLUCOSE, POC    Collection Time: 10/16/17  8:26 AM   Result Value Ref Range    Glucose (POC) 173 (H) 65 - 100 mg/dL Performed by Trang Chapin    GLUCOSE, POC    Collection Time: 10/16/17 11:55 AM   Result Value Ref Range    Glucose (POC) 240 (H) 65 - 100 mg/dL    Performed by Trang Chapin    GLUCOSE, POC    Collection Time: 10/16/17  4:29 PM   Result Value Ref Range    Glucose (POC) 117 (H) 65 - 100 mg/dL    Performed by Johnetta Closs    GLUCOSE, POC    Collection Time: 10/16/17  8:41 PM   Result Value Ref Range    Glucose (POC) 232 (H) 65 - 100 mg/dL    Performed by Simi Londono    CBC W/O DIFF    Collection Time: 10/17/17  4:04 AM   Result Value Ref Range    WBC 12.1 (H) 3.6 - 11.0 K/uL    RBC 2.39 (L) 3.80 - 5.20 M/uL    HGB 6.2 (L) 11.5 - 16.0 g/dL    HCT 19.8 (L) 35.0 - 47.0 %    MCV 82.8 80.0 - 99.0 FL    MCH 25.9 (L) 26.0 - 34.0 PG    MCHC 31.3 30.0 - 36.5 g/dL    RDW 16.1 (H) 11.5 - 14.5 %    PLATELET 810 266 - 988 K/uL   METABOLIC PANEL, BASIC    Collection Time: 10/17/17  4:04 AM   Result Value Ref Range    Sodium 137 136 - 145 mmol/L    Potassium 3.4 (L) 3.5 - 5.1 mmol/L    Chloride 105 97 - 108 mmol/L    CO2 26 21 - 32 mmol/L    Anion gap 6 5 - 15 mmol/L    Glucose 104 (H) 65 - 100 mg/dL    BUN 8 6 - 20 MG/DL    Creatinine 0.39 (L) 0.55 - 1.02 MG/DL    BUN/Creatinine ratio 21 (H) 12 - 20      GFR est AA >60 >60 ml/min/1.73m2    GFR est non-AA >60 >60 ml/min/1.73m2    Calcium 8.1 (L) 8.5 - 10.1 MG/DL       Assesment: POD#6 s/p DaVinci LSH complicated with pelvic hematoma  Still spiking fever  Hgb 6.2    Plan: Blood cx nif next fever spikes  Transfuse 2u PRBC  CBC/diff in am

## 2017-10-17 NOTE — ROUTINE PROCESS
General Surgery End of Shift Nursing Note    Bedside shift change report given to Oliver Lopez [facility] (oncoming nurse) by Josefa Briceno RN  (offgoing nurse). Report included the following information SBAR, Kardex, OR Summary, Procedure Summary, Intake/Output and MAR. Shift worked:   4-550   Significant changes during shift:    Temp 101.2 at end of shift. Motrin given. cold compress placed on forehead and covers removed. Non-emergent issues for physician to address:   none     Number times ambulated in hallway past shift: 2    Number of times OOB to chair past shift: 2    Pain Management:  Current medication: Rubi 5mg  Patient states pain is manageable on current pain medication: YES    GI:    Current diet:  DIET REGULAR    Tolerating current diet: YES  Passing flatus: YES  Last Bowel Movement: today   Appearance: not observed    Respiratory:    Incentive Spirometer at bedside: YES  Patient instructed on use: YES    Patient Safety:    Falls Score: 1  Bed Alarm On? No  Sitter?  No    Alexandra Clay

## 2017-10-18 VITALS
SYSTOLIC BLOOD PRESSURE: 124 MMHG | TEMPERATURE: 98.3 F | BODY MASS INDEX: 34.08 KG/M2 | OXYGEN SATURATION: 97 % | RESPIRATION RATE: 14 BRPM | WEIGHT: 185.19 LBS | HEART RATE: 69 BPM | HEIGHT: 62 IN | DIASTOLIC BLOOD PRESSURE: 55 MMHG

## 2017-10-18 LAB
ABO + RH BLD: NORMAL
BLD PROD TYP BPU: NORMAL
BLD PROD TYP BPU: NORMAL
BLOOD GROUP ANTIBODIES SERPL: NORMAL
BPU ID: NORMAL
BPU ID: NORMAL
CROSSMATCH RESULT,%XM: NORMAL
CROSSMATCH RESULT,%XM: NORMAL
ERYTHROCYTE [DISTWIDTH] IN BLOOD BY AUTOMATED COUNT: 15.7 % (ref 11.5–14.5)
GLUCOSE BLD STRIP.AUTO-MCNC: 97 MG/DL (ref 65–100)
HCT VFR BLD AUTO: 25.7 % (ref 35–47)
HGB BLD-MCNC: 8.2 G/DL (ref 11.5–16)
MCH RBC QN AUTO: 26.4 PG (ref 26–34)
MCHC RBC AUTO-ENTMCNC: 31.9 G/DL (ref 30–36.5)
MCV RBC AUTO: 82.6 FL (ref 80–99)
PLATELET # BLD AUTO: 426 K/UL (ref 150–400)
RBC # BLD AUTO: 3.11 M/UL (ref 3.8–5.2)
SERVICE CMNT-IMP: NORMAL
SPECIMEN EXP DATE BLD: NORMAL
STATUS OF UNIT,%ST: NORMAL
STATUS OF UNIT,%ST: NORMAL
UNIT DIVISION, %UDIV: 0
UNIT DIVISION, %UDIV: 0
WBC # BLD AUTO: 13.6 K/UL (ref 3.6–11)

## 2017-10-18 PROCEDURE — 74011250637 HC RX REV CODE- 250/637: Performed by: SPECIALIST

## 2017-10-18 PROCEDURE — 82962 GLUCOSE BLOOD TEST: CPT

## 2017-10-18 PROCEDURE — 74011250636 HC RX REV CODE- 250/636: Performed by: SPECIALIST

## 2017-10-18 PROCEDURE — 36415 COLL VENOUS BLD VENIPUNCTURE: CPT | Performed by: OBSTETRICS & GYNECOLOGY

## 2017-10-18 PROCEDURE — 85027 COMPLETE CBC AUTOMATED: CPT | Performed by: OBSTETRICS & GYNECOLOGY

## 2017-10-18 PROCEDURE — 74011000258 HC RX REV CODE- 258: Performed by: SPECIALIST

## 2017-10-18 RX ORDER — AMOXICILLIN AND CLAVULANATE POTASSIUM 875; 125 MG/1; MG/1
1 TABLET, FILM COATED ORAL 2 TIMES DAILY
Qty: 20 TAB | Refills: 0 | Status: SHIPPED | OUTPATIENT
Start: 2017-10-18

## 2017-10-18 RX ADMIN — IBUPROFEN 600 MG: 600 TABLET, FILM COATED ORAL at 09:53

## 2017-10-18 RX ADMIN — ENOXAPARIN SODIUM 40 MG: 40 INJECTION SUBCUTANEOUS at 06:33

## 2017-10-18 RX ADMIN — OXYCODONE HYDROCHLORIDE 5 MG: 5 TABLET ORAL at 09:53

## 2017-10-18 RX ADMIN — PIPERACILLIN SODIUM,TAZOBACTAM SODIUM 3.38 G: 3; .375 INJECTION, POWDER, FOR SOLUTION INTRAVENOUS at 06:57

## 2017-10-18 RX ADMIN — Medication 10 ML: at 05:34

## 2017-10-18 NOTE — ROUTINE PROCESS
General Surgery End of Shift Nursing Note    Bedside shift change report given to [facility], RN (oncoming nurse) by Della Cheatham RN and Blanco Mark RN (offgoing nurse). Report included the following information SBAR, Kardex, Intake/Output and MAR. Shift worked:   7 PM to 7 AM   Significant changes during shift:    PT received 2/2 units of blood during the shift. PT did not spike a fever and tolerated the transfusion well. PT reported 0/10 pain during the shift. Non-emergent issues for physician to address:   NA     Number times ambulated in hallway past shift: 0    Number of times OOB to chair past shift: 0    Pain Management:  Current medication: None  Patient states pain is manageable on current pain medication: YES    GI:    Current diet:  DIET REGULAR    Tolerating current diet: YES  Passing flatus: NO  Last Bowel Movement: yesterday   Appearance: Formed    Respiratory:    Incentive Spirometer at bedside: YES  Patient instructed on use: YES    Patient Safety:    Falls Score: 1  Bed Alarm On? No  Sitter?  No    Jamila Gaitan

## 2017-10-18 NOTE — PROGRESS NOTES
Problem: Falls - Risk of  Goal: *Absence of Falls  Document Svetlana Fall Risk and appropriate interventions in the flowsheet.    Outcome: Progressing Towards Goal  Fall Risk Interventions:              Medication Interventions: Teach patient to arise slowly, Evaluate medications/consider consulting pharmacy     Elimination Interventions: Call light in reach

## 2017-10-18 NOTE — DISCHARGE INSTRUCTIONS
Call if you have a fever of 102 or more, heavy bleeding, severe pain, vomiting. Ok to shower. Keep your scheduled follow up appt.

## 2017-10-18 NOTE — PROGRESS NOTES
CM completed room d/c assessment with pt. Pt will be d/c and transported home, via family member. CM will fax d/c summary to pt's PCP. Pt will have follow up appointments scheduled by CM. Pt aware that her nurse will review d/c assessment. Care Management Interventions  PCP Verified by CM: Yes  Mode of Transport at Discharge:  Other (see comment)  Transition of Care Consult (CM Consult): Discharge Planning  Discharge Durable Medical Equipment: No  Physical Therapy Consult: No  Occupational Therapy Consult: No  Speech Therapy Consult: No  Current Support Network: Lives with Spouse, Own Home  Confirm Follow Up Transport: Family  Plan discussed with Pt/Family/Caregiver: Yes  Discharge Location  Discharge Placement: [facility]/ [address] 42, MSW   [phone]

## 2017-10-19 LAB
BACTERIA SPEC CULT: NORMAL
SERVICE CMNT-IMP: NORMAL

## 2017-11-06 NOTE — DISCHARGE SUMMARY
Date of admission: 10-  Date of discharge: 10-  Admission dx: post operative pelvic abcess  Discharge dx: same    See admission H+P    Hospital course: Patient presented to the ER with abdominal pain and fever. A CT scan showed a pelvic collection. She was admitted and started on IV abx. By 10-, she was afebrile and feeling much better. She was discharged in satisfactory condition.     Discharge medications: see list  Follow up with Dr Denise Cruz within 1 week  Activity as tolerated  Diet as tolerated

## (undated) DEVICE — 1200 GUARD II KIT W/5MM TUBE W/O VAC TUBE: Brand: GUARDIAN

## (undated) DEVICE — AGENT HEMSTAT 3GM PURIFIED PLNT STARCH PWD ABSRB ARISTA AH

## (undated) DEVICE — TRI-LUMEN FILTERED TUBE SET WITH ACTIVATED CHARCOAL FILTER: Brand: AIRSEAL

## (undated) DEVICE — NEEDLE INSUF L120MM DIA2MM DISP FOR PNEUMOPERI ENDOPATH

## (undated) DEVICE — MONOPOLAR CURVED SCISSORS: Brand: ENDOWRIST

## (undated) DEVICE — DRAPE,REIN 53X77,STERILE: Brand: MEDLINE

## (undated) DEVICE — STERILE POLYISOPRENE POWDER-FREE SURGICAL GLOVES WITH EMOLLIENT COATING: Brand: PROTEXIS

## (undated) DEVICE — TROCAR SITE CLOSURE DEVICE: Brand: ENDO CLOSE

## (undated) DEVICE — BASIC PACK: Brand: CONVERTORS

## (undated) DEVICE — NEEDLE HYPO 22GA L1.5IN BLK S STL HUB POLYPR SHLD REG BVL

## (undated) DEVICE — SEAL CANN 5MM DAVINCI

## (undated) DEVICE — Device

## (undated) DEVICE — 15MM BATTERY POWERED MORCELLATOR SYSTEM WITH OBTURATOR: Brand: LINA XCISE™, LAPAROSCOPIC MORCELLATOR

## (undated) DEVICE — CATH FOL TY IC BAG 16FR 2000ML -- CONVERT TO ITEM 363158

## (undated) DEVICE — INFECTION CONTROL KIT SYS

## (undated) DEVICE — OBTRTR BLDELSS 8MM DISP -- DA VINCI - SNGL USE

## (undated) DEVICE — APPLICATOR SURG XL L38CM FOR ARISTA ABSRB HEMSTAT FLEXITIP

## (undated) DEVICE — ELECTRO LUBE IS A SINGLE PATIENT USE DEVICE THAT IS INTENDED TO BE USED ON ELECTROSURGICAL ELECTRODES TO REDUCE STICKING.: Brand: KEY SURGICAL ELECTRO LUBE

## (undated) DEVICE — SUTURE STRATAFIX SPRL PDS + SZ 2-0 L6IN ABSRB VLT L36MM SXPP1B409

## (undated) DEVICE — TIP COVER ACCESSORY

## (undated) DEVICE — SURGICAL PROCEDURE PACK GYN LAPAROSCOPY CUST SMH LF

## (undated) DEVICE — TRAY PREP DRY W/ PREM GLV 2 APPL 6 SPNG 2 UNDPD 1 OVERWRAP

## (undated) DEVICE — AIRSEAL 8 MM ACCESS PORT AND LOW PROFILE OBTURATOR WITH BLADELESS OPTICAL TIP, 120 MM LENGTH: Brand: AIRSEAL

## (undated) DEVICE — PAD SANIT NPKN 4IN GRD

## (undated) DEVICE — HANDLE LT SNAP ON ULT DURABLE LENS FOR TRUMPF ALC DISPOSABLE

## (undated) DEVICE — DEVON™ KNEE AND BODY STRAP 60" X 3" (1.5 M X 7.6 CM): Brand: DEVON

## (undated) DEVICE — SUTURE MCRYL SZ 4-0 L27IN ABSRB UD L19MM PS-2 1/2 CIR PRIM Y426H

## (undated) DEVICE — SOLUTION IV 1000ML 0.9% SOD CHL

## (undated) DEVICE — SUTURE SZ 0 27IN 5/8 CIR UR-6  TAPER PT VIOLET ABSRB VICRYL J603H

## (undated) DEVICE — REM POLYHESIVE ADULT PATIENT RETURN ELECTRODE: Brand: VALLEYLAB

## (undated) DEVICE — DERMABOND SKIN ADH 0.7ML -- DERMABOND ADVANCED 12/BX

## (undated) DEVICE — DRAPE SURG EQUIP W105XH13XL20IN 3 ARM DISPOSABLE DA VINCI S

## (undated) DEVICE — VISUALIZATION SYSTEM: Brand: CLEARIFY

## (undated) DEVICE — (D)SYR 10ML 1/5ML GRAD NSAF -- PKGING CHANGE USE ITEM 338027

## (undated) DEVICE — (D)PREP SKN CHLRAPRP APPL 26ML -- CONVERT TO ITEM 371833